# Patient Record
Sex: FEMALE | Race: BLACK OR AFRICAN AMERICAN
[De-identification: names, ages, dates, MRNs, and addresses within clinical notes are randomized per-mention and may not be internally consistent; named-entity substitution may affect disease eponyms.]

---

## 2017-03-14 ENCOUNTER — HOSPITAL ENCOUNTER (INPATIENT)
Dept: HOSPITAL 72 - EMR | Age: 67
LOS: 7 days | Discharge: HOME | DRG: 669 | End: 2017-03-21
Payer: MEDICARE

## 2017-03-14 VITALS — WEIGHT: 220 LBS | BODY MASS INDEX: 36.65 KG/M2 | HEIGHT: 65 IN

## 2017-03-14 VITALS — DIASTOLIC BLOOD PRESSURE: 77 MMHG | SYSTOLIC BLOOD PRESSURE: 173 MMHG

## 2017-03-14 VITALS — SYSTOLIC BLOOD PRESSURE: 166 MMHG | DIASTOLIC BLOOD PRESSURE: 76 MMHG

## 2017-03-14 DIAGNOSIS — J44.9: ICD-10-CM

## 2017-03-14 DIAGNOSIS — J45.909: ICD-10-CM

## 2017-03-14 DIAGNOSIS — I11.9: ICD-10-CM

## 2017-03-14 DIAGNOSIS — N13.2: Primary | ICD-10-CM

## 2017-03-14 DIAGNOSIS — N17.9: ICD-10-CM

## 2017-03-14 DIAGNOSIS — R91.1: ICD-10-CM

## 2017-03-14 DIAGNOSIS — G62.9: ICD-10-CM

## 2017-03-14 DIAGNOSIS — J31.0: ICD-10-CM

## 2017-03-14 DIAGNOSIS — E11.9: ICD-10-CM

## 2017-03-14 DIAGNOSIS — R31.9: ICD-10-CM

## 2017-03-14 LAB
ALBUMIN/GLOB SERPL: 1.1 {RATIO} (ref 1–2.7)
ALT SERPL-CCNC: 14 U/L (ref 3–33)
ANION GAP SERPL CALC-SCNC: 18 MMOL/L (ref 5–15)
APPEARANCE UR: CLEAR
AST SERPL-CCNC: 16 U/L (ref 5–40)
BACTERIA #/AREA URNS HPF: (no result) /HPF
BASOPHILS NFR BLD AUTO: 1.4 % (ref 0–2)
CALCIUM SERPL-MCNC: 10.3 MG/DL (ref 8.6–10.2)
CHLORIDE SERPL-SCNC: 95 MEQ/L (ref 98–107)
CO2 SERPL-SCNC: 26 MEQ/L (ref 20–30)
CREAT SERPL-MCNC: 1.5 MG/DL (ref 0.5–0.9)
EOSINOPHIL NFR BLD AUTO: 0.8 % (ref 0–3)
ERYTHROCYTE [DISTWIDTH] IN BLOOD BY AUTOMATED COUNT: 13.7 % (ref 11.6–14.8)
GFR SERPLBLD BASED ON 1.73 SQ M-ARVRAT: 42.1 ML/MIN (ref 60–?)
GLOBULIN SER-MCNC: 3.8 G/DL
HEMOLYSIS: 5
KETONES UR QL STRIP: NEGATIVE
LEUKOCYTE ESTERASE UR QL STRIP: (no result)
LIPASE SERPL-CCNC: 24 U/L (ref ?–60)
LYMPHOCYTES NFR BLD AUTO: 21.2 % (ref 20–45)
MCH RBC QN AUTO: 27.9 PG (ref 27–31)
MCHC RBC AUTO-ENTMCNC: 32.1 G/DL (ref 32–36)
MCV RBC AUTO: 87 FL (ref 80–99)
MONOCYTES NFR BLD AUTO: 6.7 % (ref 1–10)
NEUTROPHILS NFR BLD AUTO: 70 % (ref 45–75)
NITRITE UR QL STRIP: NEGATIVE
PH UR STRIP: 6 [PH] (ref 4.5–8)
PLATELET # BLD: 227 K/UL (ref 150–450)
PMV BLD AUTO: 8.6 FL (ref 6.5–10.1)
POTASSIUM SERPL-SCNC: 4.1 MEQ/L (ref 3.4–4.9)
PROT SERPL-MCNC: 8.1 G/DL (ref 6.6–8.7)
PROT UR QL STRIP: NEGATIVE
RBC # BLD AUTO: 4.95 M/UL (ref 4.2–5.4)
RBC #/AREA URNS HPF: (no result) /HPF (ref 0–2)
SODIUM SERPL-SCNC: 139 MEQ/L (ref 135–145)
SP GR UR STRIP: 1.01 (ref 1–1.03)
SQUAMOUS #/AREA URNS LPF: (no result) /LPF
TROPONIN I SERPL-MCNC: < 0.3 NG/ML (ref ?–0.3)
UROBILINOGEN UR-MCNC: NORMAL MG/DL (ref 0–1)
WBC # BLD AUTO: 10.9 K/UL (ref 4.8–10.8)
WBC #/AREA URNS HPF: 0 /HPF (ref 0–2)

## 2017-03-14 PROCEDURE — 85025 COMPLETE CBC W/AUTO DIFF WBC: CPT

## 2017-03-14 PROCEDURE — 82360 CALCULUS ASSAY QUANT: CPT

## 2017-03-14 PROCEDURE — 87181 SC STD AGAR DILUTION PER AGT: CPT

## 2017-03-14 PROCEDURE — 94150 VITAL CAPACITY TEST: CPT

## 2017-03-14 PROCEDURE — 93005 ELECTROCARDIOGRAM TRACING: CPT

## 2017-03-14 PROCEDURE — 74176 CT ABD & PELVIS W/O CONTRAST: CPT

## 2017-03-14 PROCEDURE — 94664 DEMO&/EVAL PT USE INHALER: CPT

## 2017-03-14 PROCEDURE — 80053 COMPREHEN METABOLIC PANEL: CPT

## 2017-03-14 PROCEDURE — 84484 ASSAY OF TROPONIN QUANT: CPT

## 2017-03-14 PROCEDURE — 94003 VENT MGMT INPAT SUBQ DAY: CPT

## 2017-03-14 PROCEDURE — 80048 BASIC METABOLIC PNL TOTAL CA: CPT

## 2017-03-14 PROCEDURE — 36415 COLL VENOUS BLD VENIPUNCTURE: CPT

## 2017-03-14 PROCEDURE — 87086 URINE CULTURE/COLONY COUNT: CPT

## 2017-03-14 PROCEDURE — 94640 AIRWAY INHALATION TREATMENT: CPT

## 2017-03-14 PROCEDURE — 83690 ASSAY OF LIPASE: CPT

## 2017-03-14 PROCEDURE — 84550 ASSAY OF BLOOD/URIC ACID: CPT

## 2017-03-14 PROCEDURE — 94760 N-INVAS EAR/PLS OXIMETRY 1: CPT

## 2017-03-14 PROCEDURE — 81003 URINALYSIS AUTO W/O SCOPE: CPT

## 2017-03-14 PROCEDURE — 82962 GLUCOSE BLOOD TEST: CPT

## 2017-03-14 PROCEDURE — 71020: CPT

## 2017-03-14 PROCEDURE — 81001 URINALYSIS AUTO W/SCOPE: CPT

## 2017-03-15 VITALS — SYSTOLIC BLOOD PRESSURE: 162 MMHG | DIASTOLIC BLOOD PRESSURE: 69 MMHG

## 2017-03-15 VITALS — SYSTOLIC BLOOD PRESSURE: 124 MMHG | DIASTOLIC BLOOD PRESSURE: 77 MMHG

## 2017-03-15 VITALS — SYSTOLIC BLOOD PRESSURE: 134 MMHG | DIASTOLIC BLOOD PRESSURE: 61 MMHG

## 2017-03-15 VITALS — SYSTOLIC BLOOD PRESSURE: 148 MMHG | DIASTOLIC BLOOD PRESSURE: 68 MMHG

## 2017-03-15 VITALS — DIASTOLIC BLOOD PRESSURE: 59 MMHG | SYSTOLIC BLOOD PRESSURE: 153 MMHG

## 2017-03-15 VITALS — DIASTOLIC BLOOD PRESSURE: 80 MMHG | SYSTOLIC BLOOD PRESSURE: 148 MMHG

## 2017-03-15 VITALS — SYSTOLIC BLOOD PRESSURE: 167 MMHG | DIASTOLIC BLOOD PRESSURE: 91 MMHG

## 2017-03-15 LAB
APPEARANCE UR: CLEAR
KETONES UR QL STRIP: NEGATIVE
NITRITE UR QL STRIP: NEGATIVE
PH UR STRIP: 6 [PH] (ref 4.5–8)
PROT UR QL STRIP: NEGATIVE
SP GR UR STRIP: 1.01 (ref 1–1.03)
UROBILINOGEN UR-MCNC: NORMAL MG/DL (ref 0–1)

## 2017-03-15 RX ADMIN — LISINOPRIL SCH MG: 2.5 TABLET ORAL at 09:09

## 2017-03-15 RX ADMIN — HEPARIN SODIUM SCH UNITS: 5000 INJECTION INTRAVENOUS; SUBCUTANEOUS at 09:14

## 2017-03-15 RX ADMIN — HEPARIN SODIUM SCH UNITS: 5000 INJECTION INTRAVENOUS; SUBCUTANEOUS at 20:06

## 2017-03-15 RX ADMIN — BUDESONIDE SCH MG: 0.25 SUSPENSION RESPIRATORY (INHALATION) at 22:53

## 2017-03-15 RX ADMIN — DOCUSATE SODIUM SCH MG: 100 TABLET ORAL at 09:08

## 2017-03-15 RX ADMIN — GLIMEPIRIDE SCH MG: 1 TABLET ORAL at 06:15

## 2017-03-15 RX ADMIN — HYDROCODONE BITARTRATE AND ACETAMINOPHEN PRN EA: 10; 325 TABLET ORAL at 13:17

## 2017-03-15 RX ADMIN — SODIUM CHLORIDE SCH MLS/HR: 900 INJECTION, SOLUTION INTRAVENOUS at 23:05

## 2017-03-15 RX ADMIN — HYDROCODONE BITARTRATE AND ACETAMINOPHEN PRN EA: 10; 325 TABLET ORAL at 19:19

## 2017-03-15 RX ADMIN — FLUTICASONE PROPIONATE SCH SPRAY: 50 SPRAY, METERED NASAL at 09:00

## 2017-03-15 RX ADMIN — SODIUM CHLORIDE SCH MLS/HR: 900 INJECTION, SOLUTION INTRAVENOUS at 07:49

## 2017-03-15 RX ADMIN — BECLOMETHASONE DIPROPIONATE SCH PUFF: 40 AEROSOL, METERED RESPIRATORY (INHALATION) at 22:39

## 2017-03-15 RX ADMIN — HYDROCODONE BITARTRATE AND ACETAMINOPHEN PRN EA: 10; 325 TABLET ORAL at 06:55

## 2017-03-15 NOTE — DIAGNOSTIC IMAGING REPORT
Indication: Abdominal pain



Technique: Spiral acquisitions obtained through the abdomen and pelvis. No oral

contrast utilized, per emergency room physician request No IV contrast utilized,

per referring physician request.. Multiplanar reconstructions were generated. Total

dose length product 993 mGycm. CTDIvol(s) 18 mGy



Comparison: None



Findings: There is a left hip prosthesis, streak artifact from which could 

obscure

pathology in the pelvis. Exam is limited by lack of oral and IV contrast.



There is a 9 mm calculus at the left ureteropelvic junction. There is resultant mild

to moderate left hydronephrosis and slight infiltration of the perinephric fat.

Multiple intrarenal calculi are also demonstrated, largest having a long axis

dimension of 12 mm. The distal left ureter is nondilated, and no distal

intraureteral calculi are demonstrated. There are small calculi within the right

kidney is well, measuring up to 3 mm diameter. No right hydronephrosis, ureteral

calculi, or hydroureter. Lack of IV contrast limits assessment of the renal

parenchyma. No gross renal parenchymal mass or cyst demonstrated. The bladder 

is

unremarkable.



Lack of IV contrast limits assessment of solid organs. The liver is 

mildly

hypoattenuating, consistent with fatty change. No focal abnormality. The gallbladder

is surgically absent. Bile ducts are nondilated. The pancreas, spleen, adrenals 

are

unremarkable. No retroperitoneal or mesenteric mass or adenopathy. The uterus 

is

diffusely bulky, likely representing fibroid changes. No pelvic mass or 

adenopathy

otherwise.



No evidence of diverticulosis or diverticulitis. The appendix is not 

definitely

visualized, but there are no findings to suggest acute appendicitis. No small 

bowel

distention. There is a broad-based ventral hernia. There is some scarring along 

an

incision to the umbilicus. There is a small sliding-type hiatal hernia. 

Broad-based

ventral hernia, associated incisional scarring



Small sliding-type hiatal hernia the graft at the left lung base, there is a 9 mm

nodule which is incompletely visualized. There are atelectatic changes at both 

lung

bases. The heart is enlarged. There is a small pericardial effusion



Impression: Positive for 9 mm left ureteropelvic junction calculus, with 

resultant

mild/moderate hydronephrosis and swelling of the left kidney



Bilateral intrarenal calculi, larger on the left than on the right



9 mm left lung nodule, incompletely imaged. Recommend followup with chest CT



Cardiomegaly



Pericardial effusion



Fatty liver



Left hip prosthesis



Bilateral basilar pulmonary parenchymal atelectatic changes



Evidence of prior cholecystectomy



Fibroid uterus



This agrees with the preliminary interpretation provided overnight by 

[facility]

teleradiology service.



The CT scanner at [facility] is accredited by the [employer] College 

of

Radiology and the scans are performed using protocols designed to limit 

radiation

exposure to as low as reasonably achievable to attain images of sufficient

resolution adequate for diagnostic evaluation.

## 2017-03-15 NOTE — HISTORY AND PHYSICAL REPORT
DATE OF ADMISSION:  03/15/2017



CHIEF COMPLAINT:  Abdominal pain.



HISTORY OF PRESENT ILLNESS:  This is a 66-year-old, 

female, who came to the ER complaining of severe abdominal pain.



PAST MEDICAL HISTORY:

1. Type 2 diabetes mellitus.

2. Chronic obstructive pulmonary disease.

3. Chronic low back pain.

4. Peripheral neuropathy.

5. Hypertensive cardiovascular disease.

6. Left lung nodule.



MEDICATIONS:  She is on baby aspirin, atorvastatin, beclomethasone,

sodium docusate, fluticasone spray, Neurontin, glimepiride,

hydrochlorothiazide, Norco, lisinopril, nifedipine ER, and omeprazole.



ALLERGIES:  To diazepam and dobutamine.



FAMILY HISTORY:  Unremarkable.



SOCIAL HISTORY:  She lives at home.  Habits, she is nonsmoker and

nondrinker.  There is no history of illicit drug abuse.



REVIEW OF SYSTEMS:  HEENT:  Hearing and eyesight are normal.

Endocrine:  She has obesity and type 2 diabetes mellitus.  Respiratory:

She has chronic obstructive pulmonary disease.  Neurological:  No history

of stroke, syncope, or Parkinson disease.



PHYSICAL EXAMINATION:

GENERAL:  This is an elderly obese,  female, who is

in no acute distress.

VITAL SIGNS:  Blood pressure 148/60, pulse 87 and regular,

respirations 20, temperature 96.8 degrees, O2 saturation 94% on room

air.

HEENT:  The head is normocephalic and atraumatic.  Pupils are equal,

round, and reactive to light and accommodation consensually.

NECK:  Supple.  Trachea is midline.  There was no lymphadenopathy or

thyromegaly.

LUNGS:  Clear to auscultation and percussion.

HEART:  Regular rate and rhythm without rubs, murmurs, or gallops.

ABDOMEN:  Soft.  Bowel sounds are active.

EXTREMITIES:  No clubbing, cyanosis, or edema.

NEUROLOGIC:  She is alert and oriented x4.  Cranial nerves II through

XII are intact.



LABORATORY AND ANCILLARY DATA:  CBC within normal limits.  Serum

chemistry, creatinine 1.5, BUN 15, otherwise within normal limits and

glucose is 99.



IMAGING STUDIES:  A CAT scan of the abdomen and pelvis without

contrast shows a 9 mm left ureteropelvic junction calculus with mild to

moderate hydronephrosis and swelling of the left kidney, bilateral

intrarenal calculi largely on the left than on the right, 9 mm left lung

nodule incompletely _____, cardiomegaly, pericardial effusion, fatty

liver, and left hip prosthesis.



ASSESSMENT:

1. Left renal calculus with mild hydronephrosis.

2. Type 2 diabetes mellitus.

3. Chronic obstructive pulmonary disease.

4. Chronic low back pain.

5. Peripheral neuropathy.

6. Hypertensive cardiovascular disease.

7. Left lung nodule.



PLAN:

1. Intravenous fluid.

2. Rehydration.

3. Pain control.

4. Urology consult.









  ______________________________________________

  Gale Velasquez M.D.





DR:  SINDY

D:  03/15/2017 15:16

T:  03/15/2017 22:15

JOB#:  [id number]

CC:

## 2017-03-15 NOTE — EMERGENCY ROOM REPORT
History of Present Illness


General


Chief Complaint:  Abdominal Pain


Source:  Patient





Present Illness


HPI


66-year-old female presents ED complaining of abdominal pain.  States symptoms 

started earlier today.  Pain is on the left flank, radiating to the lower 

abdomen.  Denies any dysuria or and fevers chills.  Denies nausea or vomiting.  

Pain is sharp.  10 out of 10.  No other aggravating relieving factors.  Denies 

any other systemic symptom


Allergies:  


Coded Allergies:  


     DIAZEPAM (Verified  Allergy, Mild, Itching, 12/13/15)


     DOBUTAMINE (Verified  Allergy, Unknown, 12/13/15)





Patient History


Past Medical History:  DM, asthma


Past Surgical History:  none


Pertinent Family History:  none


Social History:  Denies: alcohol use, drug use, smoking


Pregnant Now:  No


Immunizations:  UTD


Reviewed Nursing Documentation:  PMH: Agreed, PSxH: Agreed





Nursing Documentation-PMH


Hx Cardiac Problems:  No


Hx Hypertension:  Yes - hyperlipidemia


Hx Asthma:  Yes


Hx Diabetes:  Yes


Hx Cancer:  No


Hx Gastrointestinal Problems:  Yes


Hx Neurological Problems:  No





Review of Systems


All Other Systems:  negative except mentioned in HPI





Physical Exam





Vital Signs








  Date Time  Temp Pulse Resp B/P Pulse Ox O2 Delivery O2 Flow Rate FiO2


 


3/14/17 20:38 98.8 94 19 178/85 96 Room Air  


 


3/15/17 00:30       2.0 








Sp02 EP Interpretation:  reviewed, normal


General Appearance:  no apparent distress, alert, GCS 15, non-toxic, obese


Head:  normocephalic, atraumatic


Eyes:  bilateral eye PERRL, bilateral eye normal inspection


ENT:  hearing grossly normal, normal pharynx, no angioedema, normal voice


Neck:  full range of motion, supple/symm/no masses


Respiratory:  chest non-tender, lungs clear, normal breath sounds, speaking 

full sentences


Cardiovascular #1:  regular rate, rhythm, no edema


Cardiovascular #2:  2+ carotid (R), 2+ carotid (L), 2+ radial (R), 2+ radial (L)

, 2+ dorsalis pedis (R), 2+ dorsalis pedis (L)


Gastrointestinal:  normal bowel sounds, soft, non-distended, no guarding, no 

rebound, tenderness


Rectal:  deferred


Genitourinary:  normal inspection, CVA tenderness (L)


Musculoskeletal:  back normal, gait/station normal, normal range of motion, non-

tender


Neurologic:  alert, oriented x3, responsive, motor strength/tone normal, 

sensory intact, speech normal


Psychiatric:  judgement/insight normal, memory normal, mood/affect normal, no 

suicidal/homicidal ideation


Reflexes:  3+ bicep (R), 3+ bicep (L), 3+ tricep (R), 3+ tricep (L), 3+ knee (R)

, 3+ knee (L)


Skin:  normal color, no rash, warm/dry, well hydrated


Lymphatic:  no adenopathy





Medical Decision Making


Diagnostic Impression:  


 Primary Impression:  


 Kidney stone on left side


 Additional Impression:  


 ARF (acute renal failure)


 Qualified Codes:  N17.9 - Acute kidney failure, unspecified


ER Course


Hospital Course 


66-year-old F presents to ED with L flank pain





Differential diagnosis includes-appendicitis, cholecystitis, kidney stone, 

pyelonephritis





Clinical course


Patient placed on stretcher.  After initial history and physical I ordered labs

, IV fluids, pain medications and CT scan 





Labs - noted leukocytosis, Cr 1.5, LFTs normal





CT scan shows 9mm stone on L with hydronephrosis/perinephric stranding 





Given pain meds, IVFs. patient admitted to Dr Hargrove





I feel this is a highly complex case requiring extensive working including EKG/

Rhythm strip, Xray/CT/US, Blood/urine lab work, repeat exams while in ED, and 

administration of strong opiates/narcotics for pain control, admission to 

hospital or close patient follow up.  





Diagnosis - kidney stone on left side. ARF





admitted to floor in serious condition





Labs








Test


  3/14/17


21:55 3/14/17


21:58


 


White Blood Count


  10.9 K/UL


(4.8-10.8) 


 


 


Red Blood Count


  4.95 M/UL


(4.20-5.40) 


 


 


Hemoglobin


  13.8 G/DL


(12.0-16.0) 


 


 


Hematocrit


  43.1 %


(37.0-47.0) 


 


 


Mean Corpuscular Volume 87 FL (80-99)  


 


Mean Corpuscular Hemoglobin


  27.9 PG


(27.0-31.0) 


 


 


Mean Corpuscular Hemoglobin


Concent 32.1 G/DL


(32.0-36.0) 


 


 


Red Cell Distribution Width


  13.7 %


(11.6-14.8) 


 


 


Platelet Count


  227 K/UL


(150-450) 


 


 


Mean Platelet Volume


  8.6 FL


(6.5-10.1) 


 


 


Neutrophils (%) (Auto)


  70.0 %


(45.0-75.0) 


 


 


Lymphocytes (%) (Auto)


  21.2 %


(20.0-45.0) 


 


 


Monocytes (%) (Auto)


  6.7 %


(1.0-10.0) 


 


 


Eosinophils (%) (Auto)


  0.8 %


(0.0-3.0) 


 


 


Basophils (%) (Auto)


  1.4 %


(0.0-2.0) 


 


 


Sodium Level


  139 mEQ/L


(135-145) 


 


 


Potassium Level


  4.1 mEQ/L


(3.4-4.9) 


 


 


Chloride Level


  95 mEQ/L


() 


 


 


Carbon Dioxide Level


  26 mEQ/L


(20-30) 


 


 


Anion Gap 18 (5-15)  


 


Blood Urea Nitrogen


  15 mg/dL


(7-23) 


 


 


Creatinine


  1.5 mg/dL


(0.5-0.9) 


 


 


Estimat Glomerular Filtration


Rate 42.1 mL/min


(>60) 


 


 


Glucose Level


  99 mg/dL


() 


 


 


Calcium Level


  10.3 mg/dL


(8.6-10.2) 


 


 


Total Bilirubin


  0.5 mg/dL


(0.0-1.2) 


 


 


Aspartate Amino Transf


(AST/SGOT) 16 U/L (5-40) 


  


 


 


Alanine Aminotransferase


(ALT/SGPT) 14 U/L (3-33) 


  


 


 


Alkaline Phosphatase


  63 U/L


() 


 


 


Troponin I


  < 0.30 ng/mL


(<=0.30) 


 


 


Total Protein


  8.1 g/dL


(6.6-8.7) 


 


 


Albumin


  4.3 g/dL


(3.5-5.2) 


 


 


Globulin 3.8 g/dL  


 


Albumin/Globulin Ratio 1.1 (1.0-2.7)  


 


Lipase 24 U/L (< 60)  


 


Urine Color  Yellow 


 


Urine Appearance  Clear 


 


Urine pH  6 (4.5-8.0) 


 


Urine Specific Gravity


  


  1.010


(1.005-1.035)


 


Urine Protein


  


  Negative


(NEGATIVE)


 


Urine Glucose (UA)


  


  Negative


(NEGATIVE)


 


Urine Ketones


  


  Negative


(NEGATIVE)


 


Urine Occult Blood  2+ (NEGATIVE) 


 


Urine Nitrite


  


  Negative


(NEGATIVE)


 


Urine Bilirubin


  


  Negative


(NEGATIVE)


 


Urine Urobilinogen


  


  Normal MG/DL


(0.0-1.0)


 


Urine Leukocyte Esterase  1+ (NEGATIVE) 


 


Urine RBC


  


  2-4 /HPF (0 -


2)


 


Urine WBC  0 /HPF (0 - 2) 


 


Urine Squamous Epithelial


Cells 


  Occasional


/LPF


 


Urine Bacteria


  


  None /HPF


(NONE)








CT/MRI/US Diagnostic Results


CT/MRI/US Diagnostic Results :  


   Imaging Test Ordered:  CT A/P


   Impression


Moderate left-sided hydronephrosis and perinephric stranding. A 9 mm stone is 

seen at the left UPJ





Last Vital Signs








  Date Time  Temp Pulse Resp B/P Pulse Ox O2 Delivery O2 Flow Rate FiO2


 


3/15/17 01:31 98.7       


 


3/15/17 00:30  93 15 153/59 98 Nasal Cannula 2.0 








Status:  improved


Disposition:  ADMITTED AS INPATIENT


Condition:  Serious


Referrals:  


SINDI JIMENEZ (PCP)











THUY SANCHEZ M.D. Mar 15, 2017 02:47

## 2017-03-16 VITALS — SYSTOLIC BLOOD PRESSURE: 127 MMHG | DIASTOLIC BLOOD PRESSURE: 77 MMHG

## 2017-03-16 VITALS — DIASTOLIC BLOOD PRESSURE: 74 MMHG | SYSTOLIC BLOOD PRESSURE: 137 MMHG

## 2017-03-16 VITALS — SYSTOLIC BLOOD PRESSURE: 128 MMHG | DIASTOLIC BLOOD PRESSURE: 78 MMHG

## 2017-03-16 VITALS — SYSTOLIC BLOOD PRESSURE: 140 MMHG | DIASTOLIC BLOOD PRESSURE: 78 MMHG

## 2017-03-16 VITALS — DIASTOLIC BLOOD PRESSURE: 76 MMHG | SYSTOLIC BLOOD PRESSURE: 148 MMHG

## 2017-03-16 VITALS — SYSTOLIC BLOOD PRESSURE: 137 MMHG | DIASTOLIC BLOOD PRESSURE: 73 MMHG

## 2017-03-16 LAB
ANION GAP SERPL CALC-SCNC: 17 MMOL/L (ref 5–15)
BACTERIA #/AREA URNS HPF: (no result) /HPF
CALCIUM SERPL-MCNC: 10.1 MG/DL (ref 8.6–10.2)
CHLORIDE SERPL-SCNC: 95 MEQ/L (ref 98–107)
CO2 SERPL-SCNC: 26 MEQ/L (ref 20–30)
CREAT SERPL-MCNC: 1.6 MG/DL (ref 0.5–0.9)
GFR SERPLBLD BASED ON 1.73 SQ M-ARVRAT: 38.9 ML/MIN (ref 60–?)
HEMOLYSIS: 3
LEUKOCYTE ESTERASE UR QL STRIP: (no result)
POTASSIUM SERPL-SCNC: 3.8 MEQ/L (ref 3.4–4.9)
RBC #/AREA URNS HPF: (no result) /HPF (ref 0–2)
SODIUM SERPL-SCNC: 138 MEQ/L (ref 135–145)
SQUAMOUS #/AREA URNS LPF: (no result) /LPF
URATE SERPL-MCNC: 7.1 MG/DL (ref 3–7.5)

## 2017-03-16 RX ADMIN — BUDESONIDE SCH MG: 0.25 SUSPENSION RESPIRATORY (INHALATION) at 19:42

## 2017-03-16 RX ADMIN — ZOLPIDEM TARTRATE PRN MG: 5 TABLET ORAL at 21:57

## 2017-03-16 RX ADMIN — HYDROCODONE BITARTRATE AND ACETAMINOPHEN PRN EA: 10; 325 TABLET ORAL at 18:05

## 2017-03-16 RX ADMIN — FLUTICASONE PROPIONATE SCH SPRAY: 50 SPRAY, METERED NASAL at 09:00

## 2017-03-16 RX ADMIN — ALBUTEROL SULFATE SCH MG: 2.5 SOLUTION RESPIRATORY (INHALATION) at 10:50

## 2017-03-16 RX ADMIN — BECLOMETHASONE DIPROPIONATE SCH PUFF: 40 AEROSOL, METERED RESPIRATORY (INHALATION) at 20:02

## 2017-03-16 RX ADMIN — ALBUTEROL SULFATE SCH MG: 2.5 SOLUTION RESPIRATORY (INHALATION) at 19:32

## 2017-03-16 RX ADMIN — GLIMEPIRIDE SCH MG: 1 TABLET ORAL at 06:04

## 2017-03-16 RX ADMIN — LISINOPRIL SCH MG: 2.5 TABLET ORAL at 09:11

## 2017-03-16 RX ADMIN — HYDROCODONE BITARTRATE AND ACETAMINOPHEN PRN EA: 10; 325 TABLET ORAL at 13:50

## 2017-03-16 RX ADMIN — ALBUTEROL SULFATE SCH MG: 2.5 SOLUTION RESPIRATORY (INHALATION) at 15:11

## 2017-03-16 RX ADMIN — SODIUM CHLORIDE SCH MLS/HR: 900 INJECTION, SOLUTION INTRAVENOUS at 16:15

## 2017-03-16 RX ADMIN — HEPARIN SODIUM SCH UNITS: 5000 INJECTION INTRAVENOUS; SUBCUTANEOUS at 20:28

## 2017-03-16 RX ADMIN — ALBUTEROL SULFATE SCH MG: 2.5 SOLUTION RESPIRATORY (INHALATION) at 23:23

## 2017-03-16 RX ADMIN — HYDROCODONE BITARTRATE AND ACETAMINOPHEN PRN EA: 10; 325 TABLET ORAL at 04:58

## 2017-03-16 RX ADMIN — DOCUSATE SODIUM SCH MG: 100 TABLET ORAL at 09:10

## 2017-03-16 RX ADMIN — SODIUM CHLORIDE SCH MLS/HR: 0.9 INJECTION INTRAVENOUS at 16:15

## 2017-03-16 RX ADMIN — BUDESONIDE SCH MG: 0.25 SUSPENSION RESPIRATORY (INHALATION) at 09:22

## 2017-03-16 RX ADMIN — HYDROCODONE BITARTRATE AND ACETAMINOPHEN PRN EA: 10; 325 TABLET ORAL at 23:59

## 2017-03-16 RX ADMIN — HEPARIN SODIUM SCH UNITS: 5000 INJECTION INTRAVENOUS; SUBCUTANEOUS at 09:00

## 2017-03-16 RX ADMIN — HYDROCODONE BITARTRATE AND ACETAMINOPHEN PRN EA: 10; 325 TABLET ORAL at 00:08

## 2017-03-16 NOTE — CONSULTATION
DATE OF CONSULTATION:  03/15/2017



CONSULTING PHYSICIAN:  Gavino Haro M.D.



REFERRING PHYSICIAN:  Gale Velasquez M.D.



REASON FOR CONSULTATION:  For evaluation of renal colic, obstructive

stone, and bladder tumor.



HISTORY OF PRESENT ILLNESS:  This is a 66-year-old 

female.  She came to the hospital because of left-sided abdominal pain.

She had a workup including a CT scan, which showed evidence of an

obstructive stone in the left proximal ureter with hydronephrosis.

Urology evaluation is requested.  Of note, she does have a urologist that

she sees as an outpatient and apparently a recent cystoscopy in the office

revealed possible tumor in the bladder.  The patient states that she is

scheduled for a bladder biopsy in the near future.



PAST MEDICAL HISTORY:  Significant for history of nephrolithiasis,

history of hyperlipidemia, asthma, diabetes, and gastroesophageal reflux

disease.



PAST SURGICAL HISTORY:  Unknown.



CURRENT MEDICATIONS:  In the hospital, the patient is on Pulmicort,

heparin, aspirin, Lipitor, Colace, Neurontin, hydrochlorothiazide,

Zestril, Procardia, Flonase, Amaryl, acetaminophen, Dilaudid, and

Zofran.



ALLERGIES:  Diazepam and dobutamine.



SOCIAL HISTORY:  She is currently nonsmoker.



FAMILY HISTORY:  Noncontributory.



REVIEW OF SYSTEMS:  As above.



PHYSICAL EXAMINATION:

GENERAL:  This is an elderly female, obese, in mild distress.

VITAL SIGNS:  Temperature is 96.1 degrees, blood pressure is 124/77,

pulse 70, and respirations are 19.

HEENT:  Normocephalic.  She is missing multiple teeth.

NECK:  Supple.

ABDOMEN:  Soft.

BACK:  No CVA tenderness.

EXTREMITIES:  No clubbing or cyanosis.



LABORATORY DATA:  UA shows 2 to 4 RBCs, white count 10.9, hemoglobin

13.8, and platelets are 227,000.  BUN is 15, creatinine 1.1, and potassium

4.1.



DIAGNOSTIC IMAGING STUDIES:  The patient has CT scan of the abdomen

and pelvis.  The films were reviewed.  There was basically a 9 mm left

ureteropelvic junction stone with mild-to-moderate left-sided

hydronephrosis.  There were also multiple other bilateral renal calculi,

which were larger on the left side.  There was mention of a lung nodule.

The bladder was reported to be normal.



IMPRESSION:

1. Nephrolithiasis with colic.

2. Left-sided hydronephrosis.

3. Mild acute kidney injury.

4. Hematuria.

5. History of bladder tumor.



PLAN AND DISCUSSION:  Again as noted above, the patient does have

significant renal colic on the left side with an obstructive stone and

hydronephrosis.  The stone is very large size and I do not believe she

will pass the stone.  I believe she will need to have placement of a stent

with lithotripsy in the near future.  She also has a history of bladder

tumor and I will discuss this with her outside urologist,  ________.

She probably needs to have a bladder biopsy.  At this time, I will

discontinue the aspirin so that we will be ready for the surgery if

needed.  She states that she has not been taking aspirin at home for about

a week or so also.  I will follow the patient and any other recommendation

will be forthcoming.



Thank you, Dr. Velasquez, for asking me to see this patient in

consultation.









  ______________________________________________

  Gavino Haro M.D. DR:  Ranjith

D:  03/15/2017 20:02

T:  03/16/2017 01:41

JOB#:  [id number]

CC:

## 2017-03-16 NOTE — UROLOGY PROGRESS NOTE
Assessment/Plan


Assessment/Plan


1. Nephrolithiasis with colic.


2. Left-sided hydronephrosis.


3. Mild acute kidney injury.


4. Hematuria.


5. History of bladder tumor.





d/w pt fully


unlikely to pass stone


plan cysto/stent/litho tomorrow


risks etc


pt may need multiple procedures given heavy stone burden


NPO post midnight





Subjective


Allergies:  


Coded Allergies:  


     DIAZEPAM (Verified  Allergy, Mild, Itching, 12/13/15)


     DOBUTAMINE (Verified  Allergy, Unknown, 12/13/15)


Subjective


all noted, feels fair, still with pain, case was d/w Dr. Sutton, very small 

bladder lesion with recent cysto





Objective





Last 24 Hour Vital Signs








  Date Time  Temp Pulse Resp B/P Pulse Ox O2 Delivery O2 Flow Rate FiO2


 


3/16/17 10:27 97.3       


 


3/16/17 09:32  75 18  99 Nasal Cannula 2.0 


 


3/16/17 09:22  72 18  92 Nasal Cannula 2.0 28


 


3/16/17 09:11    127/77    


 


3/16/17 09:10  77  127/77    


 


3/16/17 08:09      Nasal Cannula 2.0 28


 


3/16/17 08:09     92 Nasal Cannula 2.0 28


 


3/16/17 08:09  72 18   Nasal Cannula 2.0 28


 


3/16/17 07:53 97.3 77 20 127/77 98 Room Air  


 


3/16/17 06:03 97.5       


 


3/16/17 04:00 97.9 84 19 148/76 97 Nasal Cannula  


 


3/16/17 00:35 97.5 73 19 137/74 92 Nasal Cannula  


 


3/15/17 22:57      Nasal Cannula 2.0 


 


3/15/17 22:56     97 Nasal Cannula 2.0 


 


3/15/17 22:52  72 18  99 Nasal Cannula 2.0 


 


3/15/17 22:51  70 18  95 Nasal Cannula 2.0 


 


3/15/17 22:44      Room Air  


 


3/15/17 22:42  81 18  95 Room Air  


 


3/15/17 22:41  85 18   Room Air  


 


3/15/17 20:00 97.9 86 19 148/80 94 Room Air  


 


3/15/17 15:58 96.1 70 19 124/77 96 Nasal Cannula 2.0 


 


3/15/17 12:40 96.8 87 19 148/68 94 Room Air  

















Intake and Output  


 


 3/15/17 3/16/17





 19:00 07:00


 


Intake Total 360 ml 695 ml


 


Output Total 320 ml 


 


Balance 40 ml 695 ml


 


  


 


Intake Oral 360 ml 240 ml


 


IV Total  455 ml


 


Output Urine Total 320 ml 


 


# Voids 2 4











Current Medications








 Medications


  (Trade)  Dose


 Ordered  Sig/Ashwini


 Route


 PRN Reason  Start Time


 Stop Time Status Last Admin


Dose Admin


 


 Acetaminophen


  (Tylenol)  500 mg  Q6H  PRN


 ORAL


 Mild Pain/Temp > 100.5  3/15/17 05:30


 4/14/17 05:29   


 


 


 Acetaminophen/


 Hydrocodone Bitart


  (Norco 10/325)  1 ea  Q4H  PRN


 ORAL


 For Pain  3/15/17 05:30


 3/22/17 05:29  3/16/17 04:58


 


 


 Albuterol Sulfate


  (Proventil)  2.5 mg  Q4HRT


 N


   3/16/17 11:00


 3/21/17 10:59   


 


 


 Atorvastatin


 Calcium


  (Lipitor)  40 mg  DAILY


 ORAL


   3/15/17 09:00


 4/14/17 08:59  3/16/17 09:10


 


 


 Beclomethasone


 Dipropionate


  (Qvar 40 Inhaler)  1 puff  BEDTIME


 INH


   3/15/17 21:00


 4/14/17 20:59   


 


 


 Budesonide


  (Pulmicort)  0.25 mg  EVERY 12  HOURS


 N


   3/15/17 21:00


 4/14/17 20:59  3/16/17 09:22


 


 


 Docusate Sodium


  (Colace)  100 mg  DAILY


 ORAL


   3/15/17 09:00


 4/14/17 08:59  3/16/17 09:10


 


 


 Fluticasone


 Propionate 1 spray  1 spray  DAILY


 NASAL


   3/15/17 09:00


 4/14/17 08:59   


 


 


 Gabapentin


  (Neurontin)  600 mg  THREE TIMES A  DAY


 ORAL


   3/15/17 09:00


 4/14/17 08:59  3/16/17 09:11


 


 


 Glimepiride


  (Amaryl)  1 mg  BEFORE  BREAKFAST


 ORAL


   3/15/17 06:30


 4/14/17 06:29  3/16/17 06:04


 


 


 Heparin Sodium


  (Porcine)


  (Heparin 5000


 units/ml)  5,000 units  EVERY 12  HOURS


 SUBQ


   3/15/17 09:00


 4/14/17 08:59  3/15/17 20:06


 


 


 Hydrochlorothiazide


  (Hydrodiuril)  25 mg  DAILY


 ORAL


   3/15/17 09:00


 4/14/17 08:59  3/16/17 09:11


 


 


 Hydromorphone HCl


  (Dilaudid)  1 mg  Q6H  PRN


 IVP


 Severe Pain (Pain Scale 7-10)  3/15/17 05:30


 3/22/17 05:29   


 


 


 Lisinopril


  (Zestril)  5 mg  DAILY


 ORAL


   3/15/17 09:00


 4/14/17 08:59  3/16/17 09:11


 


 


 Nifedipine


  (Procardia XL)  30 mg  DAILY


 ORAL


   3/15/17 09:00


 4/14/17 08:59  3/16/17 09:10


 


 


 Ondansetron HCl


  (Zofran)  4 mg  Q6H  PRN


 IVP


 Nausea & Vomiting  3/15/17 05:30


 4/14/17 05:29   


 


 


 Potassium


 Chloride/Sodium


 Chloride


  (KCl/0.45% NS


 1000ml)  1,015 ml @ 


 65 mls/hr  [id number]


 IV


   3/15/17 08:00


 4/14/17 07:59  3/15/17 23:05


 





Laboratory Tests


3/15/17 21:00: 


Urine Color Pale yellow, Urine Appearance Clear, Urine pH 6, Urine Specific 

Gravity 1.010, Urine Protein Negative, Urine Glucose (UA) Negative, Urine 

Ketones Negative, Urine Occult Blood 1+H, Urine Nitrite Negative, Urine 

Bilirubin Negative, Urine Urobilinogen Normal, Urine Leukocyte Esterase 2+H, 

Urine RBC 0-2, Urine WBC 10-15H, Urine Squamous Epithelial Cells Few, Urine 

Bacteria ModerateH


3/16/17 05:45: 


Sodium Level 138, Potassium Level 3.8, Chloride Level 95L, Carbon Dioxide Level 

26, Anion Gap 17H, Blood Urea Nitrogen 19, Creatinine 1.6H, Estimat Glomerular 

Filtration Rate 38.9, Glucose Level 107H, Uric Acid 7.1, Calcium Level 10.1


Height (Feet):  5


Height (Inches):  5.00


Weight (Pounds):  222











FAN RICHMOND Mar 16, 2017 10:40

## 2017-03-16 NOTE — ANETHESIA PREOPERATIVE EVAL
Anesthesia Pre-op PMH/ROS


General


Date of Evaluation:  Mar 16, 2017


Time of Evaluation:  09:00


Anesthesiologist:  [place]


ASA Score:  ASA 3


Mallampati Score


Class I : Soft palate, uvula, fauces, pillars visible


Class II: Soft palate, uvula, fauces visible


Class III: Soft palate, base of uvula visible


Class IV: Only hard plate visible


Mallampati Classification:  Class II


Surgeon:  Tahir


Diagnosis:  right ureteral stone


Surgical Procedure:  Right cystoscopy, ureteroscopy, stent placement


Anesthesia History:  none


Family History:  no anesthesia problems


Allergies:  


Coded Allergies:  


     DIAZEPAM (Verified  Allergy, Mild, Itching, 12/13/15)


     DOBUTAMINE (Verified  Allergy, Unknown, 12/13/15)


Medications:  see eMAR





Past Medical History


Cardiovascular:  Reports: HTN, other - HLD


Pulmonary:  Reports: COPD, asthma


Gastrointestinal/Genitourinary:  Reports: CRI, GERD


Endocrine:  Reports: DM


Musculoskeletal/Integumentary:  Reports: OA


Other:  obesity


PSxH Narrative:


hiatal hernia repair, left hip arthroplasty, thyroidectomy





Anesthesia Pre-op Phys. Exam


Physician Exam





Last Vital Signs








  Date Time  Temp Pulse Resp B/P Pulse Ox O2 Delivery O2 Flow Rate FiO2


 


3/16/17 09:11    127/77    


 


3/16/17 09:10  77      


 


3/16/17 07:53 97.3  20  98 Room Air  


 


3/15/17 22:57       2.0 








Constitutional:  NAD


Cardiovascular:  RRR


Respiratory:  CTA





Airway Exam


Mallampati Score:  Class II





Anesthesia Pre-op A/P


Labs





Chemistry








Test


  3/16/17


05:45


 


Sodium Level


  138 mEQ/L


(135-145)


 


Potassium Level


  3.8 mEQ/L


(3.4-4.9)


 


Chloride Level


  95 mEQ/L


()  L


 


Carbon Dioxide Level


  26 mEQ/L


(20-30)


 


Anion Gap 17 (5-15)  H


 


Blood Urea Nitrogen


  19 mg/dL


(7-23)


 


Creatinine


  1.6 mg/dL


(0.5-0.9)  H


 


Estimat Glomerular Filtration


Rate 38.9 mL/min


(>60)


 


Glucose Level


  107 mg/dL


()  H


 


Uric Acid


  7.1 mg/dL


(3.0-7.5)


 


Calcium Level


  10.1 mg/dL


(8.6-10.2)











Studies


Pre-op Studies:  EKG - pending





Risk Assessment & Plan


Assessment:


ASA III


Plan:


GA


Status Change Before Surgery:  No





Pre-Antibiotics


Drug:  NI MARTINEZ M.D. Mar 16, 2017 09:26

## 2017-03-16 NOTE — CONSULTATION
DATE OF CONSULTATION:  03/16/2017



PULMONARY CONSULTATION



CONSULTING PHYSICIAN:  Justus Treadwell M.D.



REASON FOR CONSULTATION:  Asthma/COPD.



HISTORY OF PRESENT ILLNESS:  This is a 66-year-old female, presented

to the emergency room with severe abdominal pain.  The patient was noted

to have kidney stone, significant obstruction.  The patient now under the

care of Dr. Velasquez, who has currently evaluated and recommended further.

The patient does complain of some shortness of breath, chest tightness

and overall discomfort.  The patient is well known to me, was recently

seen in office.  The patient does have multiple medical problems, has had

history of PET positive one nodule.  The patient has undergone lung biopsy

in 2016, which revealed conclusive.  The patient also been treated for

asthma.  She does not complain of any sputum production at this time.  No

fever or chills.   Medical notes have been

reviewed.  The care discussed with the primary admitting doctor.



PAST MEDICAL HISTORY:  The patient's past medical history notable for

diabetes, anxiety, hypertension, hypercholesterolemia, history of MI, back

in 2010, arthritis, chronic pain syndrome, asthma, stress test done in

2012.  The patient also has had prior thyroid surgery, appendectomy,

tonsillectomy, hernia repair, hip replacement and lung biopsy.



SOCIAL HISTORY:  The patient is .  She lives alone.  She did

have three children.  The patient is a former smoker and none currently.



REVIEW OF SYSTEMS:  Otherwise negative with the exception of the

above.



PHYSICAL EXAMINATION:

GENERAL:  A well developed female, comfortable and alert.  The

patient with poor dentition and missing teeth.

VITAL SIGNS:  Temperature 97.3 degrees, pulse 77, respiration 20,

blood pressure 127/77 and saturation 98%.

HEENT:  Overall negative, otherwise oropharynx is clear.  No thrush.

 

NECK:  No jugular venous distention.

LUNGS:  Good air entry.  No scattered wheezes.

CARDIAC:  Normal S1 and S2.  Regular, rate, and rhythm.  Soft

systolic murmur, left parasternal border.  No rubs or gallops.

ABDOMEN:  Soft and nontender.  No flank tenderness.

EXTREMITIES:  No cyanosis or clubbing.  There is trace edema.

NEUROLOGIC:  Grossly nonfocal.



LABORATORY AND DIAGNOSTIC DATA:  Laboratory reviewed.  CBC today with

normal white cell count of 10.9.  Electrolytes, very normal creatinine

1.6, blood sugar is 107.  Imaging noted and reviewed.  The abdominal CT

that was done is notable for 9 mm ureteropelvic junction calculus with

hydronephrosis.



IMPRESSION:

1. Renal stone with associated hydronephrosis, associated flank pain.

2. Asthma, clinically stable.

3. Mild chest tightness and cough.

4. Chronic rhinitis.

5. Hypercholesterolemia.



RECOMMENDATIONS:  Medications noted and reviewed.  The patient is

getting Pulmicort nebulized twice a day, we will give additional albuterol

and continue with inhaler, QVAR at bedtime.  I will follow clinically and

recommend oxygen therapy and assess for operative need.









  ______________________________________________

  Justus Treadwell M.D.





DR:  Justice

D:  03/16/2017 09:28

T:  03/16/2017 14:20

JOB#:  [id number]

CC:



ATUL

## 2017-03-16 NOTE — DIAGNOSTIC IMAGING REPORT
Indication: Cough



Technique: One view of the chest



Comparison: none



Findings: The heart is enlarged. Atelectasis and possibly some consolidation is seen

in the left lower lobe. Pleural spaces are clear. Right lung is clear.



Impression: Left lower lobe atelectasis and possibly some consolidation.



Cardiomegaly

## 2017-03-16 NOTE — NEPHROLOGY PROGRESS NOTE
Assessment/Plan


Plan


L RenaL Colic -for Cysto in AM By Dr Haro. Symptoms Resolving with IVF + 

Pain Control





Subjective


Subjective


No new c/o





Objective


Objective





Last 24 Hour Vital Signs








  Date Time  Temp Pulse Resp B/P Pulse Ox O2 Delivery O2 Flow Rate FiO2


 


3/16/17 13:51 97.9       


 


3/16/17 13:51 97.9       


 


3/16/17 12:22 97.9 77 19 137/73 95 Room Air  


 


3/16/17 11:05  85 18  99 Nasal Cannula 2.0 28


 


3/16/17 10:55  83 18  95 Room Air  


 


3/16/17 10:55        28


 


3/16/17 09:32  75 18  99 Nasal Cannula 2.0 


 


3/16/17 09:22  72 18  92 Nasal Cannula 2.0 28


 


3/16/17 09:11    127/77    


 


3/16/17 09:10  77  127/77    


 


3/16/17 08:09      Nasal Cannula 2.0 28


 


3/16/17 08:09     92 Nasal Cannula 2.0 28


 


3/16/17 08:09  72 18   Nasal Cannula 2.0 28


 


3/16/17 07:53 97.3 77 20 127/77 98 Room Air  


 


3/16/17 04:00 97.9 84 19 148/76 97 Nasal Cannula  


 


3/16/17 00:35 97.5 73 19 137/74 92 Nasal Cannula  


 


3/15/17 22:57      Nasal Cannula 2.0 


 


3/15/17 22:56     97 Nasal Cannula 2.0 


 


3/15/17 22:52  72 18  99 Nasal Cannula 2.0 


 


3/15/17 22:51  70 18  95 Nasal Cannula 2.0 


 


3/15/17 22:44      Room Air  


 


3/15/17 22:42  81 18  95 Room Air  


 


3/15/17 22:41  85 18   Room Air  


 


3/15/17 20:00 97.9 86 19 148/80 94 Room Air  


 


3/15/17 15:58 96.1 70 19 124/77 96 Nasal Cannula 2.0 

















Intake and Output  


 


 3/15/17 3/16/17





 19:00 07:00


 


Intake Total 360 ml 695 ml


 


Output Total 320 ml 


 


Balance 40 ml 695 ml


 


  


 


Intake Oral 360 ml 240 ml


 


IV Total  455 ml


 


Output Urine Total 320 ml 


 


# Voids 2 4








Laboratory Tests


3/15/17 21:00: 


Urine Color Pale yellow, Urine Appearance Clear, Urine pH 6, Urine Specific 

Gravity 1.010, Urine Protein Negative, Urine Glucose (UA) Negative, Urine 

Ketones Negative, Urine Occult Blood 1+H, Urine Nitrite Negative, Urine 

Bilirubin Negative, Urine Urobilinogen Normal, Urine Leukocyte Esterase 2+H, 

Urine RBC 0-2, Urine WBC 10-15H, Urine Squamous Epithelial Cells Few, Urine 

Bacteria ModerateH


3/16/17 05:45: 


Sodium Level 138, Potassium Level 3.8, Chloride Level 95L, Carbon Dioxide Level 

26, Anion Gap 17H, Blood Urea Nitrogen 19, Creatinine 1.6H, Estimat Glomerular 

Filtration Rate 38.9, Glucose Level 107H, Uric Acid 7.1, Calcium Level 10.1


Height (Feet):  5


Height (Inches):  5.00


Weight (Pounds):  222


Objective


Cv RR


Lungs CTA


Abd SNT. BS+


E No CCE











SINDI JIMENEZ Mar 16, 2017 14:07

## 2017-03-17 VITALS — SYSTOLIC BLOOD PRESSURE: 147 MMHG | DIASTOLIC BLOOD PRESSURE: 70 MMHG

## 2017-03-17 VITALS — DIASTOLIC BLOOD PRESSURE: 75 MMHG | SYSTOLIC BLOOD PRESSURE: 135 MMHG

## 2017-03-17 VITALS — SYSTOLIC BLOOD PRESSURE: 143 MMHG | DIASTOLIC BLOOD PRESSURE: 57 MMHG

## 2017-03-17 VITALS — DIASTOLIC BLOOD PRESSURE: 71 MMHG | SYSTOLIC BLOOD PRESSURE: 114 MMHG

## 2017-03-17 VITALS — DIASTOLIC BLOOD PRESSURE: 60 MMHG | SYSTOLIC BLOOD PRESSURE: 142 MMHG

## 2017-03-17 VITALS — DIASTOLIC BLOOD PRESSURE: 66 MMHG | SYSTOLIC BLOOD PRESSURE: 132 MMHG

## 2017-03-17 VITALS — SYSTOLIC BLOOD PRESSURE: 150 MMHG | DIASTOLIC BLOOD PRESSURE: 66 MMHG

## 2017-03-17 VITALS — SYSTOLIC BLOOD PRESSURE: 135 MMHG | DIASTOLIC BLOOD PRESSURE: 75 MMHG

## 2017-03-17 VITALS — SYSTOLIC BLOOD PRESSURE: 137 MMHG | DIASTOLIC BLOOD PRESSURE: 71 MMHG

## 2017-03-17 VITALS — DIASTOLIC BLOOD PRESSURE: 65 MMHG | SYSTOLIC BLOOD PRESSURE: 125 MMHG

## 2017-03-17 VITALS — DIASTOLIC BLOOD PRESSURE: 70 MMHG | SYSTOLIC BLOOD PRESSURE: 141 MMHG

## 2017-03-17 VITALS — DIASTOLIC BLOOD PRESSURE: 67 MMHG | SYSTOLIC BLOOD PRESSURE: 118 MMHG

## 2017-03-17 VITALS — SYSTOLIC BLOOD PRESSURE: 152 MMHG | DIASTOLIC BLOOD PRESSURE: 67 MMHG

## 2017-03-17 VITALS — SYSTOLIC BLOOD PRESSURE: 132 MMHG | DIASTOLIC BLOOD PRESSURE: 67 MMHG

## 2017-03-17 PROCEDURE — 0T778DZ DILATION OF LEFT URETER WITH INTRALUMINAL DEVICE, VIA NATURAL OR ARTIFICIAL OPENING ENDOSCOPIC: ICD-10-PCS

## 2017-03-17 PROCEDURE — BT1FZZZ FLUOROSCOPY OF LEFT KIDNEY, URETER AND BLADDER: ICD-10-PCS

## 2017-03-17 PROCEDURE — 0TC48ZZ EXTIRPATION OF MATTER FROM LEFT KIDNEY PELVIS, VIA NATURAL OR ARTIFICIAL OPENING ENDOSCOPIC: ICD-10-PCS

## 2017-03-17 PROCEDURE — 0TC78ZZ EXTIRPATION OF MATTER FROM LEFT URETER, VIA NATURAL OR ARTIFICIAL OPENING ENDOSCOPIC: ICD-10-PCS

## 2017-03-17 RX ADMIN — BECLOMETHASONE DIPROPIONATE SCH PUFF: 40 AEROSOL, METERED RESPIRATORY (INHALATION) at 21:00

## 2017-03-17 RX ADMIN — HYDROCODONE BITARTRATE AND ACETAMINOPHEN PRN EA: 10; 325 TABLET ORAL at 09:33

## 2017-03-17 RX ADMIN — SODIUM CHLORIDE SCH MLS/HR: 900 INJECTION, SOLUTION INTRAVENOUS at 22:28

## 2017-03-17 RX ADMIN — HYDROCODONE BITARTRATE AND ACETAMINOPHEN PRN EA: 10; 325 TABLET ORAL at 05:41

## 2017-03-17 RX ADMIN — ALBUTEROL SULFATE SCH MG: 2.5 SOLUTION RESPIRATORY (INHALATION) at 08:11

## 2017-03-17 RX ADMIN — HEPARIN SODIUM SCH UNITS: 5000 INJECTION INTRAVENOUS; SUBCUTANEOUS at 09:00

## 2017-03-17 RX ADMIN — SODIUM CHLORIDE SCH MLS/HR: 900 INJECTION, SOLUTION INTRAVENOUS at 05:41

## 2017-03-17 RX ADMIN — ALBUTEROL SULFATE SCH MG: 2.5 SOLUTION RESPIRATORY (INHALATION) at 20:03

## 2017-03-17 RX ADMIN — ALBUTEROL SULFATE SCH MG: 2.5 SOLUTION RESPIRATORY (INHALATION) at 11:17

## 2017-03-17 RX ADMIN — FENTANYL CITRATE PRN MCG: 50 INJECTION INTRAMUSCULAR; INTRAVENOUS at 17:12

## 2017-03-17 RX ADMIN — FENTANYL CITRATE PRN MCG: 50 INJECTION INTRAMUSCULAR; INTRAVENOUS at 16:50

## 2017-03-17 RX ADMIN — ALBUTEROL SULFATE SCH MG: 2.5 SOLUTION RESPIRATORY (INHALATION) at 02:57

## 2017-03-17 RX ADMIN — ALBUTEROL SULFATE SCH MG: 2.5 SOLUTION RESPIRATORY (INHALATION) at 15:00

## 2017-03-17 RX ADMIN — GLIMEPIRIDE SCH MG: 1 TABLET ORAL at 06:30

## 2017-03-17 RX ADMIN — SODIUM CHLORIDE SCH MLS/HR: 0.9 INJECTION INTRAVENOUS at 09:27

## 2017-03-17 RX ADMIN — BUDESONIDE SCH MG: 0.25 SUSPENSION RESPIRATORY (INHALATION) at 21:00

## 2017-03-17 RX ADMIN — FLUTICASONE PROPIONATE SCH SPRAY: 50 SPRAY, METERED NASAL at 09:28

## 2017-03-17 RX ADMIN — BUDESONIDE SCH MG: 0.25 SUSPENSION RESPIRATORY (INHALATION) at 08:11

## 2017-03-17 RX ADMIN — DIPHENHYDRAMINE HYDROCHLORIDE PRN MG: 50 INJECTION INTRAMUSCULAR; INTRAVENOUS at 18:28

## 2017-03-17 RX ADMIN — LISINOPRIL SCH MG: 2.5 TABLET ORAL at 09:30

## 2017-03-17 RX ADMIN — DOCUSATE SODIUM SCH MG: 100 TABLET ORAL at 09:29

## 2017-03-17 NOTE — IMMEDIATE POST-OP EVALUATION
Immediate Post-Op Evalulation


Immediate Post-Op Evalulation


Procedure:  Cysto retrograde pyelogram lithotrypsy


Date of Evaluation:  Mar 17, 2017


Time of Evaluation:  16:24


IV Fluids:  800


Blood Products:  none


Estimated Blood Loss:  min


Urinary Output:  n/a


Blood Pressure Systolic:  141


Blood Pressure Diastolic:  70


Pulse Rate:  73


Respiratory Rate:  2


O2 Sat by Pulse Oximetry:  94


Temperature (Fahrenheit):  98.3


Pain Score (1-10):  2


Nausea:  No


Vomiting:  No


Complications


none


Patient Status:  reacts, patent, extubated, none


Hydration Status:  adequate











GISELLA SMITH M.D. Mar 17, 2017 16:25

## 2017-03-17 NOTE — PRE-PROCEDURE NOTE/ATTESTATION
Pre-Procedure Note/Attestation


Complete Prior to Procedure


Planned Procedure:  left


Procedure Narrative:


cystoscopy, possible bladder biopsy, left ureteroscopy, laser and shockwave 

lithotripsy, ureteral stent





Indications for Procedure


Pre-Operative Diagnosis:


obstructive left ureteral calculus, hx of bladder lesion





Attestation


I attest that I discussed the nature of the procedure; its benefits; risks and 

complications; and alternatives (and the risks and benefits of such alternatives

), prior to the procedure, with the patient (or the patient's legal 

representative).





I attest that, if there was a reasonable possibility of needing a blood 

transfusion, the patient (or the patient's legal representative) was given the 

[place] Department [employer] Services standardized written summary, pursuant 

to the Caleb [place] Blood Safety Act ([place] Health and Safety Code # 1645, as 

amended).





I attest that I re-evaluated the patient just prior to the surgery and that 

there has been no change in the patient's H&P, except as documented below:


see progress notes











FAN RICHMOND Mar 17, 2017 11:29

## 2017-03-17 NOTE — ANETHESIA PREOPERATIVE EVAL
Anesthesia Pre-op PMH/ROS


General


Date of Evaluation:  Mar 17, 2017


Time of Evaluation:  13:50


Anesthesiologist:  Tanika


ASA Score:  ASA 3


Mallampati Score


Class I : Soft palate, uvula, fauces, pillars visible


Class II: Soft palate, uvula, fauces visible


Class III: Soft palate, base of uvula visible


Class IV: Only hard plate visible


Mallampati Classification:  Class II


Surgeon:  Tahir


Diagnosis:  L kidney stones


Surgical Procedure:  Cysto Retrograde pyelogram


Anesthesia History:  none


Allergies:  


Coded Allergies:  


     DIAZEPAM (Verified  Allergy, Mild, Itching, 12/13/15)


     DOBUTAMINE (Verified  Allergy, Unknown, 12/13/15)





Past Medical History


Cardiovascular:  Reports: CAD, HTN


Pulmonary:  Reports: COPD, asthma, 


   Denies: PARISH, other


Gastrointestinal/Genitourinary:  Reports: GERD, other - kidney stones, 


   Denies: CRI, ESRD


Neurologic/Psychiatric:  Reports: depression/anxiety, 


   Denies: CVA, TIA, dementia, other


Endocrine:  Reports: DM - stable o pills, 


   Denies: hypothyroidism, other, steroids


HEENT:  Reports: cataract (L)


Hematology/Immune:  Reports: anemia


Musculoskeletal/Integumentary:  Reports: DJD, 


   Denies: DDD, OA, RA, edema, other


Other:  obesity


PMH Narrative:


as above


PSxH Narrative:


cholecystectomy, appendectomy,thyroid, L hip arthroplasty





Anesthesia Pre-op Phys. Exam


Physician Exam





Last Vital Signs








  Date Time  Temp Pulse Resp B/P Pulse Ox O2 Delivery O2 Flow Rate FiO2


 


3/17/17 11:50 97.2 73 18 114/71 94 Nasal Cannula 2.0 


 


3/17/17 11:27        28








Constitutional:  NAD


Neurologic:  CN 2-12 intact


Cardiovascular:  RRR, no M/R/G


Respiratory:  CTA


Gastrointestinal:  other - obesity





Airway Exam


Mallampati Score:  Class II


MO:  limited


Neck:  stiff


ROM:  limited


Teeth:  missing


Dentures:  lower, upper





Anesthesia Pre-op A/P


Labs


see chart





Risk Assessment & Plan


Assessment:


ASA 3


Plan:


GA with ETT


Status Change Before Surgery:  No





Pre-Antibiotics


Drug:  Cefoxitin 1gr.


Given Within 1 Hr of Incision:  Yes


Time Given:  14:25











GISELLA SMITH M.D. Mar 17, 2017 15:03

## 2017-03-17 NOTE — PULMONOLOGY PROGRESS NOTE
Assessment/Plan


Assessment/Plan





IMPRESSION:


1. Renal stone with associated hydronephrosis, associated flank pain.


2. Asthma, clinically stable.


3. Mild chest tightness and cough.


4. Chronic rhinitis.


5. Hypercholesterolemia.








PLAN


care noted


same RX


follow up clinically 


no change for now


monitor as is





impression, plan, and exam edited and reviewed in detail


care discussed with RN





Subjective


Allergies:  


Coded Allergies:  


     DIAZEPAM (Verified  Allergy, Mild, Itching, 12/13/15)


     DOBUTAMINE (Verified  Allergy, Unknown, 12/13/15)


Subjective


care noted and discussed 


some sob but no congestion





Objective





Last 24 Hour Vital Signs








  Date Time  Temp Pulse Resp B/P Pulse Ox O2 Delivery O2 Flow Rate FiO2


 


3/17/17 11:50 97.2 73 18 114/71 94 Nasal Cannula 2.0 


 


3/17/17 11:27  82 14  99 Nasal Cannula 2.0 28


 


3/17/17 11:17  78 16  95 Nasal Cannula 2.0 28


 


3/17/17 09:30    134/64    


 


3/17/17 09:29  74  134/64    


 


3/17/17 08:28  86 18  99 Nasal Cannula 2.0 


 


3/17/17 08:19  80 18  98 Nasal Cannula 2.0 28


 


3/17/17 08:18  76 16  99 Nasal Cannula 2.0 28


 


3/17/17 08:10 97.5 68 18 118/67 95 Nasal Cannula 2.0 


 


3/17/17 08:08      Nasal Cannula 2.0 28


 


3/17/17 08:08     94 Nasal Cannula 2.0 28


 


3/17/17 08:08  72 16  94 Nasal Cannula 2.0 28


 


3/17/17 08:08  72 18   Nasal Cannula 2.0 28


 


3/17/17 04:00 97.7 74 18 125/65 97 Nasal Cannula 2.0 


 


3/17/17 03:05  79 14  99 Nasal Cannula 2.0 28


 


3/17/17 02:59        28


 


3/17/17 02:57  70 16  94 Nasal Cannula 2.0 28


 


3/17/17 00:00 97.4 71 18 142/60 97 Room Air  


 


3/16/17 23:43  83 18  99 Nasal Cannula 2.0 28


 


3/16/17 23:23  86 18  96 Nasal Cannula 2.0 28


 


3/16/17 23:23        28


 


3/16/17 20:21 98.1 86 18 140/78 93 Room Air  


 


3/16/17 19:48  86 18  99 Nasal Cannula 2.0 


 


3/16/17 19:43  84 18  99 Nasal Cannula 2.0 28


 


3/16/17 19:42  84 18  99 Nasal Cannula 2.0 28


 


3/16/17 19:35        28


 


3/16/17 19:34  83 18  92 Nasal Cannula 2.0 28


 


3/16/17 19:34      Nasal Cannula 2.0 28


 


3/16/17 19:34     92 Nasal Cannula 2.0 28


 


3/16/17 19:32  83 18   Nasal Cannula 2.0 28


 


3/16/17 16:10 98.1 90 19 128/78 92 Room Air  


 


3/16/17 15:11  85 18  99 Nasal Cannula 2.0 28


 


3/16/17 15:11        28


 


3/16/17 15:11  83 18  95 Room Air  

















Intake and Output  


 


 3/16/17 3/17/17





 19:00 07:00


 


Intake Total 880 ml 990 ml


 


Balance 880 ml 990 ml


 


  


 


Intake Oral 240 ml 240 ml


 


IV Total 640 ml 750 ml


 


# Voids 2 5








Objective


HEENT:  Overall negative, otherwise oropharynx is clear.  No thrush.


NECK:  No jugular venous distention.


LUNGS:  Good air entry.  some wheezes.


CARDIAC:  Normal S1 and S2.  Regular, rate, and rhythm.  Soft


systolic murmur, left parasternal border.  No rubs or gallops.


ABDOMEN:  Soft and nontender.  No flank tenderness.


EXTREMITIES:  No cyanosis or clubbing.  There is trace edema.


NEUROLOGIC:  Grossly nonfocal.





Microbiology








 Date/Time


Source Procedure


Growth Status


 


 


 3/15/17 21:00


Urine,Clean Catch Urine Culture - Preliminary


Gram Negative Bacillus 1 Resulted











Current Medications








 Medications


  (Trade)  Dose


 Ordered  Sig/Ashwini


 Route


 PRN Reason  Start Time


 Stop Time Status Last Admin


Dose Admin


 


 Acetaminophen


  (Tylenol)  500 mg  Q6H  PRN


 ORAL


 Mild Pain/Temp > 100.5  3/15/17 05:30


 4/14/17 05:29   


 


 


 Acetaminophen/


 Hydrocodone Bitart


  (Norco 10/325)  1 ea  Q4H  PRN


 ORAL


 For Pain  3/15/17 05:30


 3/22/17 05:29  3/17/17 09:33


 


 


 Al Hydroxide/Mg


 Hydroxide


  (Mylanta)  30 ml  TIDPRN  PRN


 ORAL


 INDIGESTION/DYSPEPSIA  3/16/17 21:30


 4/15/17 21:29  3/16/17 21:57


 


 


 Albuterol Sulfate


 2.5 mg  2.5 mg  Q4HRT


 HHN


   3/16/17 11:00


 3/21/17 10:59  3/17/17 11:17


 


 


 Atorvastatin


 Calcium


  (Lipitor)  40 mg  DAILY


 ORAL


   3/15/17 09:00


 4/14/17 08:59  3/17/17 09:33


 


 


 Beclomethasone


 Dipropionate


  (Qvar 40 Inhaler)  1 puff  BEDTIME


 INH


   3/15/17 21:00


 4/14/17 20:59   


 


 


 Budesonide


  (Pulmicort)  0.25 mg  EVERY 12  HOURS


 HHN


   3/15/17 21:00


 4/14/17 20:59  3/17/17 08:11


 


 


 Cefepime HCl/


 Dextrose


  (Maxipime/D5W)  55 ml @ 


 110 mls/hr  DAILY


 IVPB


   3/16/17 15:00


 3/23/17 14:59  3/17/17 09:27


 


 


 Docusate Sodium


  (Colace)  100 mg  DAILY


 ORAL


   3/15/17 09:00


 4/14/17 08:59  3/17/17 09:29


 


 


 Fluticasone


 Propionate 1 spray  1 spray  DAILY


 NASAL


   3/15/17 09:00


 4/14/17 08:59  3/17/17 09:28


 


 


 Gabapentin


  (Neurontin)  600 mg  THREE TIMES A  DAY


 ORAL


   3/15/17 09:00


 4/14/17 08:59  3/17/17 09:29


 


 


 Glimepiride


  (Amaryl)  1 mg  BEFORE  BREAKFAST


 ORAL


   3/15/17 06:30


 4/14/17 06:29  3/16/17 06:04


 


 


 Heparin Sodium


  (Porcine)


  (Heparin 5000


 units/ml)  5,000 units  EVERY 12  HOURS


 SUBQ


   3/15/17 09:00


 4/14/17 08:59  3/16/17 20:28


 


 


 Hydrochlorothiazide


  (Hydrodiuril)  25 mg  DAILY


 ORAL


   3/15/17 09:00


 4/14/17 08:59  3/17/17 09:29


 


 


 Hydromorphone HCl


  (Dilaudid)  1 mg  Q6H  PRN


 IVP


 Severe Pain (Pain Scale 7-10)  3/15/17 05:30


 3/22/17 05:29   


 


 


 Lisinopril


  (Zestril)  5 mg  DAILY


 ORAL


   3/15/17 09:00


 4/14/17 08:59  3/17/17 09:30


 


 


 Nifedipine


  (Procardia XL)  30 mg  DAILY


 ORAL


   3/15/17 09:00


 4/14/17 08:59  3/17/17 09:29


 


 


 Ondansetron HCl


  (Zofran)  4 mg  Q6H  PRN


 IVP


 Nausea & Vomiting  3/15/17 05:30


 4/14/17 05:29   


 


 


 Pantoprazole


  (Protonix)  40 mg  DAILY


 ORAL


   3/17/17 09:00


 4/16/17 08:59  3/17/17 09:30


 


 


 Potassium


 Chloride/Sodium


 Chloride


  (KCl/0.45% NS


 1000ml)  1,015 ml @ 


 65 mls/hr  [id number]


 IV


   3/15/17 08:00


 4/14/17 07:59  3/17/17 05:41


 


 


 Zolpidem Tartrate


  (Ambien)  5 mg  HSPRN  PRN


 ORAL


 Insomnia  3/16/17 21:15


 4/15/17 21:14  3/16/17 21:57


 

















ETHEL FERNANDEZ Mar 17, 2017 13:59

## 2017-03-17 NOTE — UROLOGY PROGRESS NOTE
Assessment/Plan


Assessment/Plan


1. Nephrolithiasis with colic.


2. Left-sided hydronephrosis.


3. Mild acute kidney injury.


4. Hematuria.


5. History of bladder tumor.





d/w pt fully


unlikely to pass stone


plan cysto/stent/litho today


risks etc


pt may need multiple procedures given heavy stone burden


NPO





Subjective


Allergies:  


Coded Allergies:  


     DIAZEPAM (Verified  Allergy, Mild, Itching, 12/13/15)


     DOBUTAMINE (Verified  Allergy, Unknown, 12/13/15)


Subjective


all noted, feels fair, still with pain, plan OR today





Objective





Last 24 Hour Vital Signs








  Date Time  Temp Pulse Resp B/P Pulse Ox O2 Delivery O2 Flow Rate FiO2


 


3/17/17 11:17  78 16  95 Nasal Cannula 2.0 28


 


3/17/17 09:30    134/64    


 


3/17/17 09:29  74  134/64    


 


3/17/17 08:28  86 18  99 Nasal Cannula 2.0 


 


3/17/17 08:19  80 18  98 Nasal Cannula 2.0 28


 


3/17/17 08:10 97.5 68 18 118/67 95 Nasal Cannula 2.0 


 


3/17/17 08:08      Nasal Cannula 2.0 28


 


3/17/17 08:08     94 Nasal Cannula 2.0 28


 


3/17/17 08:08  72 16  94 Nasal Cannula 2.0 28


 


3/17/17 08:08  72 18   Nasal Cannula 2.0 28


 


3/17/17 04:00 97.7 74 18 125/65 97 Nasal Cannula 2.0 


 


3/17/17 03:05  79 14  99 Nasal Cannula 2.0 28


 


3/17/17 02:59        28


 


3/17/17 02:57  70 16  94 Nasal Cannula 2.0 28


 


3/17/17 00:00 97.4 71 18 142/60 97 Room Air  


 


3/16/17 23:43  83 18  99 Nasal Cannula 2.0 28


 


3/16/17 23:23  86 18  96 Nasal Cannula 2.0 28


 


3/16/17 23:23        28


 


3/16/17 20:21 98.1 86 18 140/78 93 Room Air  


 


3/16/17 19:48  86 18  99 Nasal Cannula 2.0 


 


3/16/17 19:43  84 18  99 Nasal Cannula 2.0 28


 


3/16/17 19:42  84 18  99 Nasal Cannula 2.0 28


 


3/16/17 19:35        28


 


3/16/17 19:34  83 18  92 Nasal Cannula 2.0 28


 


3/16/17 19:34      Nasal Cannula 2.0 28


 


3/16/17 19:34     92 Nasal Cannula 2.0 28


 


3/16/17 19:32  83 18   Nasal Cannula 2.0 28


 


3/16/17 16:10 98.1 90 19 128/78 92 Room Air  


 


3/16/17 15:11  85 18  99 Nasal Cannula 2.0 28


 


3/16/17 15:11        28


 


3/16/17 15:11  83 18  95 Room Air  


 


3/16/17 13:51 97.9       


 


3/16/17 13:51 97.9       


 


3/16/17 12:22 97.9 77 19 137/73 95 Room Air  

















Intake and Output  


 


 3/16/17 3/17/17





 19:00 07:00


 


Intake Total 880 ml 990 ml


 


Balance 880 ml 990 ml


 


  


 


Intake Oral 240 ml 240 ml


 


IV Total 640 ml 750 ml


 


# Voids 2 5











Microbiology








 Date/Time


Source Procedure


Growth Status


 


 


 3/15/17 21:00


Urine,Clean Catch Urine Culture - Preliminary


Gram Negative Bacillus 1 Resulted








Current Medications








 Medications


  (Trade)  Dose


 Ordered  Sig/Ashwini


 Route


 PRN Reason  Start Time


 Stop Time Status Last Admin


Dose Admin


 


 Acetaminophen


  (Tylenol)  500 mg  Q6H  PRN


 ORAL


 Mild Pain/Temp > 100.5  3/15/17 05:30


 4/14/17 05:29   


 


 


 Acetaminophen/


 Hydrocodone Bitart


  (Norco 10/325)  1 ea  Q4H  PRN


 ORAL


 For Pain  3/15/17 05:30


 3/22/17 05:29  3/17/17 09:33


 


 


 Al Hydroxide/Mg


 Hydroxide


  (Mylanta)  30 ml  TIDPRN  PRN


 ORAL


 INDIGESTION/DYSPEPSIA  3/16/17 21:30


 4/15/17 21:29  3/16/17 21:57


 


 


 Albuterol Sulfate


 2.5 mg  2.5 mg  Q4HRT


 HHN


   3/16/17 11:00


 3/21/17 10:59  3/17/17 11:17


 


 


 Atorvastatin


 Calcium


  (Lipitor)  40 mg  DAILY


 ORAL


   3/15/17 09:00


 4/14/17 08:59  3/17/17 09:33


 


 


 Beclomethasone


 Dipropionate


  (Qvar 40 Inhaler)  1 puff  BEDTIME


 INH


   3/15/17 21:00


 4/14/17 20:59   


 


 


 Budesonide


  (Pulmicort)  0.25 mg  EVERY 12  HOURS


 HHN


   3/15/17 21:00


 4/14/17 20:59  3/17/17 08:11


 


 


 Cefepime HCl/


 Dextrose


  (Maxipime/D5W)  55 ml @ 


 110 mls/hr  DAILY


 IVPB


   3/16/17 15:00


 3/23/17 14:59  3/17/17 09:27


 


 


 Docusate Sodium


  (Colace)  100 mg  DAILY


 ORAL


   3/15/17 09:00


 4/14/17 08:59  3/17/17 09:29


 


 


 Fluticasone


 Propionate 1 spray  1 spray  DAILY


 NASAL


   3/15/17 09:00


 4/14/17 08:59  3/17/17 09:28


 


 


 Gabapentin


  (Neurontin)  600 mg  THREE TIMES A  DAY


 ORAL


   3/15/17 09:00


 4/14/17 08:59  3/17/17 09:29


 


 


 Glimepiride


  (Amaryl)  1 mg  BEFORE  BREAKFAST


 ORAL


   3/15/17 06:30


 4/14/17 06:29  3/16/17 06:04


 


 


 Heparin Sodium


  (Porcine)


  (Heparin 5000


 units/ml)  5,000 units  EVERY 12  HOURS


 SUBQ


   3/15/17 09:00


 4/14/17 08:59  3/16/17 20:28


 


 


 Hydrochlorothiazide


  (Hydrodiuril)  25 mg  DAILY


 ORAL


   3/15/17 09:00


 4/14/17 08:59  3/17/17 09:29


 


 


 Hydromorphone HCl


  (Dilaudid)  1 mg  Q6H  PRN


 IVP


 Severe Pain (Pain Scale 7-10)  3/15/17 05:30


 3/22/17 05:29   


 


 


 Lisinopril


  (Zestril)  5 mg  DAILY


 ORAL


   3/15/17 09:00


 4/14/17 08:59  3/17/17 09:30


 


 


 Nifedipine


  (Procardia XL)  30 mg  DAILY


 ORAL


   3/15/17 09:00


 4/14/17 08:59  3/17/17 09:29


 


 


 Ondansetron HCl


  (Zofran)  4 mg  Q6H  PRN


 IVP


 Nausea & Vomiting  3/15/17 05:30


 4/14/17 05:29   


 


 


 Pantoprazole


  (Protonix)  40 mg  DAILY


 ORAL


   3/17/17 09:00


 4/16/17 08:59  3/17/17 09:30


 


 


 Potassium


 Chloride/Sodium


 Chloride


  (KCl/0.45% NS


 1000ml)  1,015 ml @ 


 65 mls/hr  [id number]


 IV


   3/15/17 08:00


 4/14/17 07:59  3/17/17 05:41


 


 


 Zolpidem Tartrate


  (Ambien)  5 mg  HSPRN  PRN


 ORAL


 Insomnia  3/16/17 21:15


 4/15/17 21:14  3/16/17 21:57


 








Height (Feet):  5


Height (Inches):  5.00


Weight (Pounds):  220


Objective


 exam stable











BRYCEFAN MANCIA Mar 17, 2017 11:27

## 2017-03-17 NOTE — BRIEF OPERATIVE NOTE
Immediate Post Operative Note


Operative Note


Pre-op Diagnosis:


obstructive left ureteral calculus, hx of bladder lesion


Procedure:


cystoscopy, left ureteroscopy, laser and shockwave lithotripsy, ureteral stent


Post-op Diagnosis:  same as pre-op


Surgeon:  radha


Anesthesiologist:  ton


Anesthesia:  general


Specimen:  yes - stone fragment


Complications:  none


Condition:  stable


Estimated Blood Loss:  minimal


Implant(s) used?:  Yes - 6f x 26 cm left ureteral JJ stent











FAN RICHMOND Mar 17, 2017 16:03

## 2017-03-18 VITALS — DIASTOLIC BLOOD PRESSURE: 74 MMHG | SYSTOLIC BLOOD PRESSURE: 116 MMHG

## 2017-03-18 VITALS — DIASTOLIC BLOOD PRESSURE: 69 MMHG | SYSTOLIC BLOOD PRESSURE: 132 MMHG

## 2017-03-18 VITALS — SYSTOLIC BLOOD PRESSURE: 125 MMHG | DIASTOLIC BLOOD PRESSURE: 64 MMHG

## 2017-03-18 VITALS — SYSTOLIC BLOOD PRESSURE: 109 MMHG | DIASTOLIC BLOOD PRESSURE: 68 MMHG

## 2017-03-18 VITALS — SYSTOLIC BLOOD PRESSURE: 119 MMHG | DIASTOLIC BLOOD PRESSURE: 59 MMHG

## 2017-03-18 VITALS — DIASTOLIC BLOOD PRESSURE: 75 MMHG | SYSTOLIC BLOOD PRESSURE: 140 MMHG

## 2017-03-18 LAB
ANION GAP SERPL CALC-SCNC: 17 MMOL/L (ref 5–15)
BASOPHILS NFR BLD AUTO: 1.2 % (ref 0–2)
CALCIUM SERPL-MCNC: 9.8 MG/DL (ref 8.6–10.2)
CHLORIDE SERPL-SCNC: 95 MEQ/L (ref 98–107)
CO2 SERPL-SCNC: 26 MEQ/L (ref 20–30)
CREAT SERPL-MCNC: 1.2 MG/DL (ref 0.5–0.9)
EOSINOPHIL NFR BLD AUTO: 2.1 % (ref 0–3)
ERYTHROCYTE [DISTWIDTH] IN BLOOD BY AUTOMATED COUNT: 13.2 % (ref 11.6–14.8)
GFR SERPLBLD BASED ON 1.73 SQ M-ARVRAT: 54.3 ML/MIN (ref 60–?)
HEMOLYSIS: 1
LYMPHOCYTES NFR BLD AUTO: 33.1 % (ref 20–45)
MCH RBC QN AUTO: 27.7 PG (ref 27–31)
MCHC RBC AUTO-ENTMCNC: 32.1 G/DL (ref 32–36)
MCV RBC AUTO: 86 FL (ref 80–99)
MONOCYTES NFR BLD AUTO: 8.2 % (ref 1–10)
NEUTROPHILS NFR BLD AUTO: 55.4 % (ref 45–75)
PLATELET # BLD: 264 K/UL (ref 150–450)
PMV BLD AUTO: 9.9 FL (ref 6.5–10.1)
POTASSIUM SERPL-SCNC: 4.6 MEQ/L (ref 3.4–4.9)
RBC # BLD AUTO: 4.55 M/UL (ref 4.2–5.4)
SODIUM SERPL-SCNC: 138 MEQ/L (ref 135–145)
WBC # BLD AUTO: 6.1 K/UL (ref 4.8–10.8)

## 2017-03-18 RX ADMIN — ALBUTEROL SULFATE SCH MG: 2.5 SOLUTION RESPIRATORY (INHALATION) at 15:39

## 2017-03-18 RX ADMIN — BECLOMETHASONE DIPROPIONATE SCH PUFF: 40 AEROSOL, METERED RESPIRATORY (INHALATION) at 20:19

## 2017-03-18 RX ADMIN — GLIMEPIRIDE SCH MG: 1 TABLET ORAL at 05:37

## 2017-03-18 RX ADMIN — HYDROCODONE BITARTRATE AND ACETAMINOPHEN PRN EA: 10; 325 TABLET ORAL at 10:06

## 2017-03-18 RX ADMIN — ALBUTEROL SULFATE SCH MG: 2.5 SOLUTION RESPIRATORY (INHALATION) at 04:03

## 2017-03-18 RX ADMIN — ZOLPIDEM TARTRATE PRN MG: 5 TABLET ORAL at 02:13

## 2017-03-18 RX ADMIN — ALBUTEROL SULFATE SCH MG: 2.5 SOLUTION RESPIRATORY (INHALATION) at 19:51

## 2017-03-18 RX ADMIN — BUDESONIDE SCH MG: 0.25 SUSPENSION RESPIRATORY (INHALATION) at 09:10

## 2017-03-18 RX ADMIN — LISINOPRIL SCH MG: 2.5 TABLET ORAL at 09:13

## 2017-03-18 RX ADMIN — HYDROCODONE BITARTRATE AND ACETAMINOPHEN PRN EA: 10; 325 TABLET ORAL at 16:31

## 2017-03-18 RX ADMIN — ALBUTEROL SULFATE SCH MG: 2.5 SOLUTION RESPIRATORY (INHALATION) at 10:52

## 2017-03-18 RX ADMIN — SODIUM CHLORIDE SCH MLS/HR: 900 INJECTION, SOLUTION INTRAVENOUS at 05:12

## 2017-03-18 RX ADMIN — HYDROCODONE BITARTRATE AND ACETAMINOPHEN PRN EA: 10; 325 TABLET ORAL at 05:36

## 2017-03-18 RX ADMIN — DIPHENHYDRAMINE HYDROCHLORIDE PRN MG: 50 INJECTION INTRAMUSCULAR; INTRAVENOUS at 00:35

## 2017-03-18 RX ADMIN — ALBUTEROL SULFATE SCH MG: 2.5 SOLUTION RESPIRATORY (INHALATION) at 03:00

## 2017-03-18 RX ADMIN — BUDESONIDE SCH MG: 0.25 SUSPENSION RESPIRATORY (INHALATION) at 19:51

## 2017-03-18 RX ADMIN — HYDROCODONE BITARTRATE AND ACETAMINOPHEN PRN EA: 10; 325 TABLET ORAL at 23:34

## 2017-03-18 RX ADMIN — ALBUTEROL SULFATE SCH MG: 2.5 SOLUTION RESPIRATORY (INHALATION) at 07:22

## 2017-03-18 RX ADMIN — DOCUSATE SODIUM SCH MG: 100 TABLET ORAL at 09:12

## 2017-03-18 RX ADMIN — SODIUM CHLORIDE SCH MLS/HR: 0.9 INJECTION INTRAVENOUS at 10:07

## 2017-03-18 RX ADMIN — FLUTICASONE PROPIONATE SCH SPRAY: 50 SPRAY, METERED NASAL at 09:18

## 2017-03-18 NOTE — 48 HOUR POST ANESTHESIA EVAL
Post Anesthesia Evaluation


Procedure:  Cysto retrograde pyelogram lithotrypsy


Date of Evaluation:  Mar 18, 2017


Time of Evaluation:  10:16


Blood Pressure Systolic:  116


0:  74


Pulse Rate:  68


Respiratory Rate:  22


Temperature (Fahrenheit):  97.5


O2 Sat by Pulse Oximetry:  96


Airway:  patent


Nausea:  No


Vomiting:  No


Pain Intensity:  3


Hydration Status:  adequate


Cardiopulmonary Status:


stable


Mental Status/LOC:  patient returned to baseline


Follow-up Care/Observations:


n/a


Post-Anesthesia Complications:


none


Follow-up care needed:  N/A











GISELLA SMITH M.D. Mar 18, 2017 10:18

## 2017-03-18 NOTE — OPERATIVE NOTE - DICTATED
DATE OF OPERATION:  03/17/2017



PREOPERATIVE DIAGNOSIS:  Multiple left renal calculi.



POSTOPERATIVE DIAGNOSIS:  Multiple left renal calculi.



PROCEDURE PERFORMED:  Cystoscopy, left ureteroscopy, laser

lithotripsy, shockwave lithotripsy, stone extraction, retrograde

pyelogram, and placement of double-J stent.



OPERATIVE SURGEON:  Gavino Haro M.D.



ANESTHESIOLOGIST:  Adan Sagastume M.D.



ANESTHESIA:  General.



INDICATION FOR PROCEDURE:  This is a pleasant 66-year-old female.

She came to the hospital because of left-sided flank pain.  She had a

workup including a CT scan, which showed an obstructing stone of the left

UPJ with hydronephrosis.  There were also two other stones in the left

kidney in the upper pole and midpole.  There was also a questionable

history of bladder lesion, which the patient stated.  The patient was

having significant pain and because of this obstructing stone was of a

large size of about 9 mm or so before that it was unlikely that it would

pass and as such we decided to proceed with above procedure for definitive

treatment.  The patient has been on intravenous antibiotics on the floor.

The nature of the procedure including possible risks and complications of

bleeding, infection, anesthesia, damage to the urethra, bladder, ureter,

or need for further surgery were discussed.  I specifically did tell that

because of the heavy stone noted that she may eventually need to have

multiple procedures to completely clear all the stones.  All her questions

were answered.  No guarantees were given prior.



FINDINGS:  The patient had an obstructive stone in the UPJ, which is

packed and was pushed back into the kidney.  All three stones were

treated.



DESCRIPTION OF PROCEDURE:  After informed consent was obtained from

the patient, the patient was then brought to the operating room and placed

in the supine position.  After successful general sedation was induced,

the patient was then positioned over the focal point of the shockwave

generator, which was placed in a modified dorsal lithotomy position.

Preoperative intravenous antibiotics had been administered.  Time-out was

performed.  The urethra was gently dilated.  Cystoscopy was performed.

The bladder was inspected carefully.  I did not see any lesions or tumors

in the bladder or anything suspicious.  On  fluoroscopy, the stones

were then visible.  At this point, the left ureteral orifice was

cannulated with open-ended catheter and the guidewire was then passed up.

There was resistance noted at the level of the stone, which was impacted

at the UPJ.  I was able to negotiate the wire into the kidney.  This was

left as a safety wire.  Rigid ureteroscopy was then performed.  The distal

ureter was somewhat stenotic.  I had to pass a second wire and pass the

scope in to the wire to get to the stone, at which time the stone was

pushed back into the kidney.  There was return of some urine with

__________ purulent.  I did aspirate some of the urine from the kidney and

there was no purulence and no odor and I thought that it was safe for this

procedure.  At this point, a flexible cystoscope was then passed up into

the kidney.  The endoscopy was then performed.  The large obstructive

stone was identified in the renal pelvis.  I was able to eventually

identify the stone in the upper pole as well as the middle pole and then

the combination of laser lithotripsy was performed using a 200 micron

Holmium laser fiber as well as shockwave lithotripsy using the [employer]

Modulith and about 1200 or so shock waves were delivered to the large

stone and then rest of the shock waves were divided and the other two

stones excellent fragmentation was noted.  At the end of the procedure,

all the fragments appeared to be passable.  I did inject contrast to

delineate the anatomy.  There was moderate dilatation of the collecting

system.  I then used a tipless basket to remove the small fragments for

analysis and ureteroscope was removed.  A 6-[place] x 26 cm double-J stent

was passed into the collecting system under fluoroscopic guidance.  It

appeared to be in good position with a good curve in the kidney as well as

the bladder.  A small string was noted at the bladder end.  Arguello catheter

was placed.  The patient was awakened and was taken to the recovery room

in stable condition.  Blood loss is minimal.  No complications.









  ______________________________________________

  Gavino Haro M.D.





DR:  JOHAN

D:  03/18/2017 14:49

T:  03/18/2017 21:29

JOB#:  [id number]

CC:  Gale Velasquez M.D.; Fax#:  [phone]

## 2017-03-18 NOTE — GENERAL PROGRESS NOTE
Assessment/Plan


Assessment/Plan


1) left renal calculus with mild to moderate hydronephrosis and hematuria


2) KATHLEEN--resolved


3) Lung nodule


4) HTN


5) DM


6) COPD


7) peripheral neuropathy





Plan


monitor CBC


f/u 


continue Abx


lab today


Discussed with pt and RN





Subjective


Allergies:  


Coded Allergies:  


     DIAZEPAM (Verified  Allergy, Mild, Itching, 12/13/15)


     DOBUTAMINE (Verified  Allergy, Unknown, 12/13/15)


Subjective


Still having hematuria. flank pain is better now. No reported fever





Objective





Last 24 Hour Vital Signs








  Date Time  Temp Pulse Resp B/P Pulse Ox O2 Delivery O2 Flow Rate FiO2


 


3/18/17 12:00 97.3 72 20 109/68 95 Nasal Cannula 2.0 


 


3/18/17 11:02  71 18  97 Nasal Cannula 2.0 28


 


3/18/17 10:52  71 18  94 Nasal Cannula 2.0 28


 


3/18/17 10:18  68 22  96   


 


3/18/17 09:20  75 20  98 Nasal Cannula 2.0 28


 


3/18/17 09:13    116/74    


 


3/18/17 09:10  73 20  96 Nasal Cannula 2.0 28


 


3/18/17 08:00 96.8 78 20 116/74 95 Nasal Cannula 2.0 


 


3/18/17 07:22     94 Nasal Cannula 2.0 28


 


3/18/17 07:22  71 18  94 Nasal Cannula 2.0 28


 


3/18/17 07:22  71 18  97 Nasal Cannula 2.0 28


 


3/18/17 07:22      Nasal Cannula 2.0 28


 


3/18/17 07:22  71 20   Nasal Cannula 2.0 28


 


3/18/17 06:35 98.6       


 


3/18/17 04:00 98.6 79 18 119/59    


 


3/18/17 03:40  72 16  97 Nasal Cannula 2.0 28


 


3/18/17 03:30  70 16  94 Nasal Cannula 2.0 28


 


3/18/17 00:00 98.8 84 20 125/64 94 Nasal Cannula  


 


3/17/17 23:16  74 20  95 Nasal Cannula 2.0 28


 


3/17/17 23:16  78 20  98 Nasal Cannula 2.0 28


 


3/17/17 21:30  78 20  96 Nasal Cannula 2.0 28


 


3/17/17 21:30  81 20  98 Nasal Cannula 2.0 28


 


3/17/17 20:07  79 20  97 Nasal Cannula 2.0 28


 


3/17/17 20:06      Nasal Cannula 2.0 28


 


3/17/17 20:06  77 20  94 Nasal Cannula 2.0 28


 


3/17/17 20:06     94 Nasal Cannula 2.0 28


 


3/17/17 20:00 98.2 81 20 132/66 95 Nasal Cannula 2.0 


 


3/17/17 19:30  77 20   Nasal Cannula 2.0 28


 


3/17/17 17:27 97.8 73 16 150/66 97 Nasal Cannula 2.0 


 


3/17/17 17:15  75 17 147/70 95 Nasal Cannula 2.0 


 


3/17/17 17:10  72 16 152/67 95 Nasal Cannula 2.0 


 


3/17/17 17:00  76 18 135/75 95 Nasal Cannula 2.0 


 


3/17/17 16:50  72 15 135/75 95 Nasal Cannula 2.0 


 


3/17/17 16:45  76 14 132/67 97 Nasal Cannula 2.0 


 


3/17/17 16:30  77 15 137/71 95 Nasal Cannula 2.0 


 


3/17/17 16:25  73 2  94   


 


3/17/17 16:20  73 16 141/70 96 Nasal Cannula 2.0 


 


3/17/17 16:16 98.5 81 22 143/57 96 Nasal Cannula 2.0 


 


3/17/17 15:13      Nasal Cannula  


 


3/17/17 15:13      Nasal Cannula  

















Intake and Output  


 


 3/17/17 3/18/17





 19:00 07:00


 


Intake Total 615 ml 1715 ml


 


Output Total 900 ml 1900 ml


 


Balance -285 ml -185 ml


 


  


 


Intake Oral 100 ml 1000 ml


 


IV Total 515 ml 715 ml


 


Output Urine Total 900 ml 1900 ml


 


# Voids 2 








Height (Feet):  5


Height (Inches):  5.00


Weight (Pounds):  220


Objective


NAD, AAOx3


CTA b/l,no rales, no rhonchi


S1,S2,RRR, no M/R/G


soft, non tender, BS+


no edema, Arguello's cath with hematuria











OLSON,MESSI Mar 18, 2017 13:09

## 2017-03-18 NOTE — UROLOGY PROGRESS NOTE
Assessment/Plan


Assessment/Plan


1. Nephrolithiasis with colic.


2. Left-sided hydronephrosis.


3. Mild acute kidney injury.


4. Hematuria.


5. History of bladder tumor.


6. POD # 1, ureteroscopy/litho/stent





monitor clinically


abx


monitor renal fxn, check bmp


dc walters





Subjective


Allergies:  


Coded Allergies:  


     DIAZEPAM (Verified  Allergy, Mild, Itching, 12/13/15)


     DOBUTAMINE (Verified  Allergy, Unknown, 12/13/15)


Subjective


all noted, feels fair, flank pain resolved





Objective





Last 24 Hour Vital Signs








  Date Time  Temp Pulse Resp B/P Pulse Ox O2 Delivery O2 Flow Rate FiO2


 


3/18/17 10:52  71 18  94 Nasal Cannula 2.0 28


 


3/18/17 10:18  68 22  96   


 


3/18/17 09:20  75 20  98 Nasal Cannula 2.0 28


 


3/18/17 09:13    116/74    


 


3/18/17 09:10  73 20  96 Nasal Cannula 2.0 28


 


3/18/17 08:00 96.8 78 20 116/74 95 Nasal Cannula 2.0 


 


3/18/17 07:22     94 Nasal Cannula 2.0 28


 


3/18/17 07:22  71 18  94 Nasal Cannula 2.0 28


 


3/18/17 07:22  71 18  97 Nasal Cannula 2.0 28


 


3/18/17 07:22      Nasal Cannula 2.0 28


 


3/18/17 07:22  71 20   Nasal Cannula 2.0 28


 


3/18/17 06:35 98.6       


 


3/18/17 04:00 98.6 79 18 119/59    


 


3/18/17 03:40  72 16  97 Nasal Cannula 2.0 28


 


3/18/17 03:30  70 16  94 Nasal Cannula 2.0 28


 


3/18/17 00:00 98.8 84 20 125/64 94 Nasal Cannula  


 


3/17/17 23:16  74 20  95 Nasal Cannula 2.0 28


 


3/17/17 23:16  78 20  98 Nasal Cannula 2.0 28


 


3/17/17 21:30  78 20  96 Nasal Cannula 2.0 28


 


3/17/17 21:30  81 20  98 Nasal Cannula 2.0 28


 


3/17/17 20:07  79 20  97 Nasal Cannula 2.0 28


 


3/17/17 20:06      Nasal Cannula 2.0 28


 


3/17/17 20:06  77 20  94 Nasal Cannula 2.0 28


 


3/17/17 20:06     94 Nasal Cannula 2.0 28


 


3/17/17 20:00 98.2 81 20 132/66 95 Nasal Cannula 2.0 


 


3/17/17 19:30  77 20   Nasal Cannula 2.0 28


 


3/17/17 17:27 97.8 73 16 150/66 97 Nasal Cannula 2.0 


 


3/17/17 17:15  75 17 147/70 95 Nasal Cannula 2.0 


 


3/17/17 17:10  72 16 152/67 95 Nasal Cannula 2.0 


 


3/17/17 17:00  76 18 135/75 95 Nasal Cannula 2.0 


 


3/17/17 16:50  72 15 135/75 95 Nasal Cannula 2.0 


 


3/17/17 16:45  76 14 132/67 97 Nasal Cannula 2.0 


 


3/17/17 16:30  77 15 137/71 95 Nasal Cannula 2.0 


 


3/17/17 16:25  73 2  94   


 


3/17/17 16:20  73 16 141/70 96 Nasal Cannula 2.0 


 


3/17/17 16:16 98.5 81 22 143/57 96 Nasal Cannula 2.0 


 


3/17/17 15:13      Nasal Cannula  


 


3/17/17 15:13      Nasal Cannula  


 


3/17/17 11:50 97.2 73 18 114/71 94 Nasal Cannula 2.0 


 


3/17/17 11:27  82 14  99 Nasal Cannula 2.0 28


 


3/17/17 11:17  78 16  95 Nasal Cannula 2.0 28

















Intake and Output  


 


 3/17/17 3/18/17





 19:00 07:00


 


Intake Total 615 ml 1715 ml


 


Output Total 900 ml 1900 ml


 


Balance -285 ml -185 ml


 


  


 


Intake Oral 100 ml 1000 ml


 


IV Total 515 ml 715 ml


 


Output Urine Total 900 ml 1900 ml


 


# Voids 2 











Microbiology








 Date/Time


Source Procedure


Growth Status


 


 


 3/15/17 21:00


Urine,Clean Catch Urine Culture - Final


Escherichia Coli Complete








Current Medications








 Medications


  (Trade)  Dose


 Ordered  Sig/Ashwini


 Route


 PRN Reason  Start Time


 Stop Time Status Last Admin


Dose Admin


 


 Acetaminophen


  (Tylenol)  500 mg  Q6H  PRN


 ORAL


 Mild Pain/Temp > 100.5  3/15/17 05:30


 4/14/17 05:29   


 


 


 Acetaminophen/


 Hydrocodone Bitart


  (Norco 10/325)  1 ea  Q4H  PRN


 ORAL


 For Pain  3/15/17 05:30


 3/22/17 05:29  3/18/17 10:06


 


 


 Al Hydroxide/Mg


 Hydroxide


  (Mylanta)  30 ml  TIDPRN  PRN


 ORAL


 INDIGESTION/DYSPEPSIA  3/16/17 21:30


 4/15/17 21:29  3/16/17 21:57


 


 


 Albuterol Sulfate


 2.5 mg  2.5 mg  Q4HRT


 HHN


   3/16/17 11:00


 3/21/17 10:59  3/18/17 10:52


 


 


 Atorvastatin


 Calcium


  (Lipitor)  40 mg  DAILY


 ORAL


   3/15/17 09:00


 4/14/17 08:59  3/18/17 09:12


 


 


 Beclomethasone


 Dipropionate


  (Qvar 40 Inhaler)  1 puff  BEDTIME


 INH


   3/15/17 21:00


 4/14/17 20:59   


 


 


 Budesonide


  (Pulmicort)  0.25 mg  EVERY 12  HOURS


 HHN


   3/15/17 21:00


 4/14/17 20:59  3/18/17 09:10


 


 


 Cefepime HCl/


 Dextrose


  (Maxipime/D5W)  55 ml @ 


 110 mls/hr  DAILY


 IVPB


   3/16/17 15:00


 3/23/17 14:59  3/18/17 10:07


 


 


 Docusate Sodium


  (Colace)  100 mg  DAILY


 ORAL


   3/15/17 09:00


 4/14/17 08:59  3/18/17 09:12


 


 


 Fluticasone


 Propionate 1 spray  1 spray  DAILY


 NASAL


   3/15/17 09:00


 4/14/17 08:59  3/18/17 09:18


 


 


 Gabapentin


  (Neurontin)  600 mg  THREE TIMES A  DAY


 ORAL


   3/15/17 09:00


 4/14/17 08:59  3/18/17 09:13


 


 


 Glimepiride


  (Amaryl)  1 mg  BEFORE  BREAKFAST


 ORAL


   3/15/17 06:30


 4/14/17 06:29  3/18/17 05:37


 


 


 Hydrochlorothiazide


  (Hydrodiuril)  25 mg  DAILY


 ORAL


   3/15/17 09:00


 4/14/17 08:59  3/17/17 09:29


 


 


 Hydromorphone HCl


  (Dilaudid)  1 mg  Q6H  PRN


 IVP


 Severe Pain (Pain Scale 7-10)  3/15/17 05:30


 3/22/17 05:29  3/18/17 00:35


 


 


 Lisinopril


  (Zestril)  5 mg  DAILY


 ORAL


   3/15/17 09:00


 4/14/17 08:59  3/18/17 09:13


 


 


 Nifedipine


  (Procardia XL)  30 mg  DAILY


 ORAL


   3/15/17 09:00


 4/14/17 08:59  3/17/17 09:29


 


 


 Ondansetron HCl


  (Zofran)  4 mg  Q6H  PRN


 IVP


 Nausea & Vomiting  3/15/17 05:30


 4/14/17 05:29   


 


 


 Pantoprazole


  (Protonix)  40 mg  DAILY


 ORAL


   3/17/17 09:00


 4/16/17 08:59  3/18/17 09:13


 


 


 Potassium


 Chloride/Sodium


 Chloride


  (KCl/0.45% NS


 1000ml)  1,015 ml @ 


 65 mls/hr  [id number]


 IV


   3/15/17 08:00


 4/14/17 07:59  3/18/17 05:12


 


 


 Zolpidem Tartrate


  (Ambien)  5 mg  HSPRN  PRN


 ORAL


 Insomnia  3/16/17 21:15


 4/15/17 21:14  3/18/17 02:13


 








Height (Feet):  5


Height (Inches):  5.00


Weight (Pounds):  220


Objective


 exam stable, walters in place, urine blood-tinged











BRYCESHADFAN 18, 2017 11:09

## 2017-03-18 NOTE — PULMONOLOGY PROGRESS NOTE
Assessment/Plan


Assessment/Plan





IMPRESSION:


1. Renal stone with associated hydronephrosis, associated flank pain.


2. Asthma, clinically stable.


3. Mild chest tightness and cough.


4. Chronic rhinitis.


5. Hypercholesterolemia.








PLAN


care noted


same RX as is


follow up clinically 


no change for now and no other changes noted


monitor as is and will adjust if needed





impression, plan, and exam edited and reviewed in detail


care discussed with RN





Subjective


Allergies:  


Coded Allergies:  


     DIAZEPAM (Verified  Allergy, Mild, Itching, 12/13/15)


     DOBUTAMINE (Verified  Allergy, Unknown, 12/13/15)


Subjective


care noted and discussed 


mild cough





Objective





Last 24 Hour Vital Signs








  Date Time  Temp Pulse Resp B/P Pulse Ox O2 Delivery O2 Flow Rate FiO2


 


3/18/17 09:13    116/74    


 


3/18/17 07:22     94 Nasal Cannula 2.0 28


 


3/18/17 07:22  71 18  94 Nasal Cannula 2.0 28


 


3/18/17 07:22  71 18  97 Nasal Cannula 2.0 28


 


3/18/17 07:22      Nasal Cannula 2.0 28


 


3/18/17 07:22  71 20   Nasal Cannula 2.0 28


 


3/18/17 06:35 98.6       


 


3/18/17 04:00 98.6 79 18 119/59    


 


3/18/17 03:40  72 16  97 Nasal Cannula 2.0 28


 


3/18/17 03:30  70 16  94 Nasal Cannula 2.0 28


 


3/18/17 00:00 98.8 84 20 125/64 94 Nasal Cannula  


 


3/17/17 23:16  74 20  95 Nasal Cannula 2.0 28


 


3/17/17 23:16  78 20  98 Nasal Cannula 2.0 28


 


3/17/17 21:30  78 20  96 Nasal Cannula 2.0 28


 


3/17/17 21:30  81 20  98 Nasal Cannula 2.0 28


 


3/17/17 20:07  79 20  97 Nasal Cannula 2.0 28


 


3/17/17 20:06      Nasal Cannula 2.0 28


 


3/17/17 20:06  77 20  94 Nasal Cannula 2.0 28


 


3/17/17 20:06     94 Nasal Cannula 2.0 28


 


3/17/17 20:00 98.2 81 20 132/66 95 Nasal Cannula 2.0 


 


3/17/17 19:30  77 20   Nasal Cannula 2.0 28


 


3/17/17 17:27 97.8 73 16 150/66 97 Nasal Cannula 2.0 


 


3/17/17 17:15  75 17 147/70 95 Nasal Cannula 2.0 


 


3/17/17 17:10  72 16 152/67 95 Nasal Cannula 2.0 


 


3/17/17 17:00  76 18 135/75 95 Nasal Cannula 2.0 


 


3/17/17 16:50  72 15 135/75 95 Nasal Cannula 2.0 


 


3/17/17 16:45  76 14 132/67 97 Nasal Cannula 2.0 


 


3/17/17 16:30  77 15 137/71 95 Nasal Cannula 2.0 


 


3/17/17 16:25  73 2  94   


 


3/17/17 16:20  73 16 141/70 96 Nasal Cannula 2.0 


 


3/17/17 16:16 98.5 81 22 143/57 96 Nasal Cannula 2.0 


 


3/17/17 15:13      Nasal Cannula  


 


3/17/17 15:13      Nasal Cannula  


 


3/17/17 11:50 97.2 73 18 114/71 94 Nasal Cannula 2.0 


 


3/17/17 11:27  82 14  99 Nasal Cannula 2.0 28


 


3/17/17 11:17  78 16  95 Nasal Cannula 2.0 28


 


3/17/17 09:30    134/64    


 


3/17/17 09:29  74  134/64    

















Intake and Output  


 


 3/17/17 3/18/17





 19:00 07:00


 


Intake Total 615 ml 1715 ml


 


Output Total 900 ml 1900 ml


 


Balance -285 ml -185 ml


 


  


 


Intake Oral 100 ml 1000 ml


 


IV Total 515 ml 715 ml


 


Output Urine Total 900 ml 1900 ml


 


# Voids 2 








Objective


HEENT:  Overall negative, otherwise oropharynx is clear.  No thrush.


NECK:  No jugular venous distention.


LUNGS:  Good air entry.  some wheezes.


CARDIAC:  Normal S1 and S2.  Regular, rate, and rhythm.  Soft


systolic murmur, left parasternal border.  No rubs or gallops.


ABDOMEN:  Soft and nontender.  No flank tenderness.


EXTREMITIES:  No cyanosis or clubbing.  There is trace edema.


NEUROLOGIC:  Grossly nonfocal.





Microbiology








 Date/Time


Source Procedure


Growth Status


 


 


 3/15/17 21:00


Urine,Clean Catch Urine Culture - Preliminary


Gram Negative Bacillus 1 Resulted











Current Medications








 Medications


  (Trade)  Dose


 Ordered  Sig/Ashwini


 Route


 PRN Reason  Start Time


 Stop Time Status Last Admin


Dose Admin


 


 Acetaminophen


  (Tylenol)  500 mg  Q6H  PRN


 ORAL


 Mild Pain/Temp > 100.5  3/15/17 05:30


 4/14/17 05:29   


 


 


 Acetaminophen/


 Hydrocodone Bitart


  (Norco 10/325)  1 ea  Q4H  PRN


 ORAL


 For Pain  3/15/17 05:30


 3/22/17 05:29  3/18/17 05:36


 


 


 Al Hydroxide/Mg


 Hydroxide


  (Mylanta)  30 ml  TIDPRN  PRN


 ORAL


 INDIGESTION/DYSPEPSIA  3/16/17 21:30


 4/15/17 21:29  3/16/17 21:57


 


 


 Albuterol Sulfate


 2.5 mg  2.5 mg  Q4HRT


 HHN


   3/16/17 11:00


 3/21/17 10:59  3/18/17 07:22


 


 


 Atorvastatin


 Calcium


  (Lipitor)  40 mg  DAILY


 ORAL


   3/15/17 09:00


 4/14/17 08:59  3/18/17 09:12


 


 


 Beclomethasone


 Dipropionate


  (Qvar 40 Inhaler)  1 puff  BEDTIME


 INH


   3/15/17 21:00


 4/14/17 20:59   


 


 


 Budesonide


  (Pulmicort)  0.25 mg  EVERY 12  HOURS


 HHN


   3/15/17 21:00


 4/14/17 20:59  3/18/17 09:10


 


 


 Cefepime HCl/


 Dextrose


  (Maxipime/D5W)  55 ml @ 


 110 mls/hr  DAILY


 IVPB


   3/16/17 15:00


 3/23/17 14:59  3/17/17 09:27


 


 


 Docusate Sodium


  (Colace)  100 mg  DAILY


 ORAL


   3/15/17 09:00


 4/14/17 08:59  3/18/17 09:12


 


 


 Fluticasone


 Propionate 1 spray  1 spray  DAILY


 NASAL


   3/15/17 09:00


 4/14/17 08:59  3/18/17 09:18


 


 


 Gabapentin


  (Neurontin)  600 mg  THREE TIMES A  DAY


 ORAL


   3/15/17 09:00


 4/14/17 08:59  3/18/17 09:13


 


 


 Glimepiride


  (Amaryl)  1 mg  BEFORE  BREAKFAST


 ORAL


   3/15/17 06:30


 4/14/17 06:29  3/18/17 05:37


 


 


 Hydrochlorothiazide


  (Hydrodiuril)  25 mg  DAILY


 ORAL


   3/15/17 09:00


 4/14/17 08:59  3/17/17 09:29


 


 


 Hydromorphone HCl


  (Dilaudid)  1 mg  Q6H  PRN


 IVP


 Severe Pain (Pain Scale 7-10)  3/15/17 05:30


 3/22/17 05:29  3/18/17 00:35


 


 


 Lisinopril


  (Zestril)  5 mg  DAILY


 ORAL


   3/15/17 09:00


 4/14/17 08:59  3/18/17 09:13


 


 


 Nifedipine


  (Procardia XL)  30 mg  DAILY


 ORAL


   3/15/17 09:00


 4/14/17 08:59  3/17/17 09:29


 


 


 Ondansetron HCl


  (Zofran)  4 mg  Q6H  PRN


 IVP


 Nausea & Vomiting  3/15/17 05:30


 4/14/17 05:29   


 


 


 Pantoprazole


  (Protonix)  40 mg  DAILY


 ORAL


   3/17/17 09:00


 4/16/17 08:59  3/18/17 09:13


 


 


 Potassium


 Chloride/Sodium


 Chloride


  (KCl/0.45% NS


 1000ml)  1,015 ml @ 


 65 mls/hr  [id number]


 IV


   3/15/17 08:00


 4/14/17 07:59  3/18/17 05:12


 


 


 Zolpidem Tartrate


  (Ambien)  5 mg  HSPRN  PRN


 ORAL


 Insomnia  3/16/17 21:15


 4/15/17 21:14  3/18/17 02:13


 

















ETHEL FERNANDEZ 18, 2017 09:25

## 2017-03-19 VITALS — DIASTOLIC BLOOD PRESSURE: 71 MMHG | SYSTOLIC BLOOD PRESSURE: 129 MMHG

## 2017-03-19 VITALS — DIASTOLIC BLOOD PRESSURE: 73 MMHG | SYSTOLIC BLOOD PRESSURE: 142 MMHG

## 2017-03-19 VITALS — DIASTOLIC BLOOD PRESSURE: 56 MMHG | SYSTOLIC BLOOD PRESSURE: 136 MMHG

## 2017-03-19 VITALS — SYSTOLIC BLOOD PRESSURE: 133 MMHG | DIASTOLIC BLOOD PRESSURE: 52 MMHG

## 2017-03-19 VITALS — DIASTOLIC BLOOD PRESSURE: 69 MMHG | SYSTOLIC BLOOD PRESSURE: 120 MMHG

## 2017-03-19 VITALS — SYSTOLIC BLOOD PRESSURE: 140 MMHG | DIASTOLIC BLOOD PRESSURE: 80 MMHG

## 2017-03-19 RX ADMIN — BUDESONIDE SCH MG: 0.25 SUSPENSION RESPIRATORY (INHALATION) at 09:28

## 2017-03-19 RX ADMIN — ALBUTEROL SULFATE SCH MG: 2.5 SOLUTION RESPIRATORY (INHALATION) at 07:00

## 2017-03-19 RX ADMIN — ALBUTEROL SULFATE SCH MG: 2.5 SOLUTION RESPIRATORY (INHALATION) at 14:54

## 2017-03-19 RX ADMIN — ALBUTEROL SULFATE SCH MG: 2.5 SOLUTION RESPIRATORY (INHALATION) at 03:57

## 2017-03-19 RX ADMIN — DOCUSATE SODIUM SCH MG: 100 TABLET ORAL at 08:45

## 2017-03-19 RX ADMIN — SODIUM CHLORIDE SCH MLS/HR: 900 INJECTION, SOLUTION INTRAVENOUS at 04:03

## 2017-03-19 RX ADMIN — HYDROCODONE BITARTRATE AND ACETAMINOPHEN PRN EA: 10; 325 TABLET ORAL at 18:24

## 2017-03-19 RX ADMIN — ALBUTEROL SULFATE SCH MG: 2.5 SOLUTION RESPIRATORY (INHALATION) at 11:44

## 2017-03-19 RX ADMIN — HYDROCODONE BITARTRATE AND ACETAMINOPHEN PRN EA: 10; 325 TABLET ORAL at 08:47

## 2017-03-19 RX ADMIN — SODIUM CHLORIDE SCH MLS/HR: 0.9 INJECTION INTRAVENOUS at 10:35

## 2017-03-19 RX ADMIN — ALBUTEROL SULFATE SCH MG: 2.5 SOLUTION RESPIRATORY (INHALATION) at 00:30

## 2017-03-19 RX ADMIN — ALBUTEROL SULFATE SCH MG: 2.5 SOLUTION RESPIRATORY (INHALATION) at 23:28

## 2017-03-19 RX ADMIN — GLIMEPIRIDE SCH MG: 1 TABLET ORAL at 05:55

## 2017-03-19 RX ADMIN — BUDESONIDE SCH MG: 0.25 SUSPENSION RESPIRATORY (INHALATION) at 21:15

## 2017-03-19 RX ADMIN — HYDROCODONE BITARTRATE AND ACETAMINOPHEN PRN EA: 10; 325 TABLET ORAL at 04:04

## 2017-03-19 RX ADMIN — FLUTICASONE PROPIONATE SCH SPRAY: 50 SPRAY, METERED NASAL at 10:34

## 2017-03-19 RX ADMIN — LISINOPRIL SCH MG: 2.5 TABLET ORAL at 08:46

## 2017-03-19 RX ADMIN — SODIUM CHLORIDE SCH MLS/HR: 900 INJECTION, SOLUTION INTRAVENOUS at 20:09

## 2017-03-19 RX ADMIN — HYDROCODONE BITARTRATE AND ACETAMINOPHEN PRN EA: 10; 325 TABLET ORAL at 13:32

## 2017-03-19 RX ADMIN — ALBUTEROL SULFATE SCH MG: 2.5 SOLUTION RESPIRATORY (INHALATION) at 19:11

## 2017-03-19 NOTE — UROLOGY PROGRESS NOTE
Assessment/Plan


Assessment/Plan


1. Nephrolithiasis with colic.


2. Left-sided hydronephrosis.


3. Mild acute kidney injury, better.


4. Hematuria.


5. History of bladder tumor.


6. POD # 2, ureteroscopy/litho/stent





monitor clinically


abx


monitor renal fxn





Subjective


Allergies:  


Coded Allergies:  


     DIAZEPAM (Verified  Allergy, Mild, Itching, 12/13/15)


     DOBUTAMINE (Verified  Allergy, Unknown, 12/13/15)


Subjective


all noted, walters removed, voiding





Objective





Last 24 Hour Vital Signs








  Date Time  Temp Pulse Resp B/P Pulse Ox O2 Delivery O2 Flow Rate FiO2


 


3/19/17 09:39  74 20  100 Nasal Cannula 2.0 28


 


3/19/17 09:29  73 20  96 Nasal Cannula 2.0 28


 


3/19/17 08:46  92  140/80    


 


3/19/17 08:46    140/80    


 


3/19/17 08:00 97.3 92 20 140/80 100 Room Air  


 


3/19/17 07:48      Nasal Cannula  


 


3/19/17 07:47      Nasal Cannula  


 


3/19/17 07:47      Nasal Cannula  


 


3/19/17 05:03 97.9       


 


3/19/17 04:18 97.9 77 18 142/73 95 Nasal Cannula 2.0 


 


3/19/17 03:59  72 18  95 Nasal Cannula 2.0 28


 


3/19/17 03:47  70 18  93 Nasal Cannula 2.0 28


 


3/19/17 00:00 97.9 84 20 129/71 99  2.0 


 


3/18/17 23:55  77 18  97 Nasal Cannula 2.0 28


 


3/18/17 23:45  75 18  95 Nasal Cannula 2.0 28


 


3/18/17 20:04      Room Air  


 


3/18/17 20:00 97.3 79 20 132/69 97 Nasal Cannula 2.0 


 


3/18/17 19:55      Nasal Cannula 2.0 28


 


3/18/17 19:55      Nasal Cannula  


 


3/18/17 19:55     94 Nasal Cannula 2.0 28


 


3/18/17 19:54  72 20  94 Nasal Cannula 2.0 28


 


3/18/17 19:54        28


 


3/18/17 19:05  74 18  97 Nasal Cannula 2.0 28


 


3/18/17 16:00 97.7 71 20 140/75 96 Nasal Cannula 2.0 


 


3/18/17 15:49  66 16  99 Nasal Cannula 2.0 


 


3/18/17 15:39  66 18  95 Nasal Cannula 2.0 


 


3/18/17 12:00 97.3 72 20 109/68 95 Nasal Cannula 2.0 


 


3/18/17 11:02  71 18  97 Nasal Cannula 2.0 28


 


3/18/17 10:52  71 18  94 Nasal Cannula 2.0 28


 


3/18/17 10:18  68 22  96   

















Intake and Output  


 


 3/18/17 3/19/17





 19:00 07:00


 


Intake Total 890 ml 1440 ml


 


Output Total 1550 ml 1200 ml


 


Balance -660 ml 240 ml


 


  


 


Intake Oral 600 ml 660 ml


 


IV Total 290 ml 780 ml


 


Output Urine Total 1550 ml 1200 ml











Microbiology








 Date/Time


Source Procedure


Growth Status


 


 


 3/15/17 21:00


Urine,Clean Catch Urine Culture - Final


Escherichia Coli Complete








Current Medications








 Medications


  (Trade)  Dose


 Ordered  Sig/Ashwini


 Route


 PRN Reason  Start Time


 Stop Time Status Last Admin


Dose Admin


 


 Acetaminophen


  (Tylenol)  500 mg  Q6H  PRN


 ORAL


 Mild Pain/Temp > 100.5  3/15/17 05:30


 4/14/17 05:29   


 


 


 Acetaminophen/


 Hydrocodone Bitart


  (Norco 10/325)  1 ea  Q4H  PRN


 ORAL


 For Pain  3/15/17 05:30


 3/22/17 05:29  3/19/17 08:47


 


 


 Al Hydroxide/Mg


 Hydroxide


  (Mylanta)  30 ml  TIDPRN  PRN


 ORAL


 INDIGESTION/DYSPEPSIA  3/16/17 21:30


 4/15/17 21:29  3/16/17 21:57


 


 


 Albuterol Sulfate


 2.5 mg  2.5 mg  Q4HRT


 [facility]


   3/16/17 11:00


 3/21/17 10:59  3/19/17 03:57


 


 


 Atorvastatin


 Calcium


  (Lipitor)  40 mg  DAILY


 ORAL


   3/15/17 09:00


 4/14/17 08:59  3/19/17 08:46


 


 


 Beclomethasone


 Dipropionate


  (Qvar 40 Inhaler)  1 puff  BEDTIME


 INH


   3/15/17 21:00


 4/14/17 20:59   


 


 


 Budesonide


  (Pulmicort)  0.25 mg  EVERY 12  HOURS


 [facility]


   3/15/17 21:00


 4/14/17 20:59  3/19/17 09:28


 


 


 Cefepime HCl/


 Dextrose


  (Maxipime/D5W)  55 ml @ 


 110 mls/hr  DAILY


 IVPB


   3/16/17 15:00


 3/23/17 14:59  3/18/17 10:07


 


 


 Docusate Sodium


  (Colace)  100 mg  DAILY


 ORAL


   3/15/17 09:00


 4/14/17 08:59  3/19/17 08:45


 


 


 Fluticasone


 Propionate 1 spray  1 spray  DAILY


 NASAL


   3/15/17 09:00


 4/14/17 08:59  3/18/17 09:18


 


 


 Gabapentin


  (Neurontin)  600 mg  THREE TIMES A  DAY


 ORAL


   3/15/17 09:00


 4/14/17 08:59  3/19/17 08:45


 


 


 Glimepiride


  (Amaryl)  1 mg  BEFORE  BREAKFAST


 ORAL


   3/15/17 06:30


 4/14/17 06:29  3/19/17 05:55


 


 


 Hydrochlorothiazide


  (Hydrodiuril)  25 mg  DAILY


 ORAL


   3/15/17 09:00


 4/14/17 08:59  3/19/17 08:46


 


 


 Hydromorphone HCl


  (Dilaudid)  1 mg  Q6H  PRN


 IVP


 Severe Pain (Pain Scale 7-10)  3/15/17 05:30


 3/22/17 05:29  3/18/17 00:35


 


 


 Lisinopril


  (Zestril)  5 mg  DAILY


 ORAL


   3/15/17 09:00


 4/14/17 08:59  3/19/17 08:46


 


 


 Nifedipine


  (Procardia XL)  30 mg  DAILY


 ORAL


   3/15/17 09:00


 4/14/17 08:59  3/19/17 08:46


 


 


 Ondansetron HCl


  (Zofran)  4 mg  Q6H  PRN


 IVP


 Nausea & Vomiting  3/15/17 05:30


 4/14/17 05:29   


 


 


 Pantoprazole


  (Protonix)  40 mg  DAILY


 ORAL


   3/17/17 09:00


 4/16/17 08:59  3/19/17 08:44


 


 


 Potassium


 Chloride/Sodium


 Chloride


  (KCl/0.45% NS


 1000ml)  1,015 ml @ 


 65 mls/hr  [id number]


 IV


   3/15/17 08:00


 4/14/17 07:59  3/19/17 04:03


 


 


 Zolpidem Tartrate


  (Ambien)  5 mg  HSPRN  PRN


 ORAL


 Insomnia  3/16/17 21:15


 4/15/17 21:14  3/18/17 02:13


 





Laboratory Tests


3/18/17 13:25: 


White Blood Count 6.1, Red Blood Count 4.55, Hemoglobin 12.6, Hematocrit 39.3, 

Mean Corpuscular Volume 86, Mean Corpuscular Hemoglobin 27.7, Mean Corpuscular 

Hemoglobin Concent 32.1, Red Cell Distribution Width 13.2, Platelet Count 264, 

Mean Platelet Volume 9.9, Neutrophils (%) (Auto) 55.4, Lymphocytes (%) (Auto) 

33.1, Monocytes (%) (Auto) 8.2, Eosinophils (%) (Auto) 2.1, Basophils (%) (Auto

) 1.2, Sodium Level 138, Potassium Level 4.6, Chloride Level 95L, Carbon 

Dioxide Level 26, Anion Gap 17H, Blood Urea Nitrogen 16, Creatinine 1.2H, 

Estimat Glomerular Filtration Rate 54.3, Glucose Level 119H, Calcium Level 9.8


Height (Feet):  5


Height (Inches):  5.00


Weight (Pounds):  220


Objective


 exam stable











FAN RICHMOND Mar 19, 2017 09:56

## 2017-03-19 NOTE — GENERAL PROGRESS NOTE
Assessment/Plan


Assessment/Plan


1) left renal calculus with mild to moderate hydronephrosis and hematuria  s/p 

litho and stent


2) KATHLEEN--resolved


3) Lung nodule


4) HTN


5) DM


6) COPD


7) peripheral neuropathy





Plan


monitor CBC


f/u 


continue Abx


Discussed with pt and RN





Subjective


Allergies:  


Coded Allergies:  


     DIAZEPAM (Verified  Allergy, Mild, Itching, 12/13/15)


     DOBUTAMINE (Verified  Allergy, Unknown, 12/13/15)


Subjective


Arguello was removed. small stone was passed last night. Hematuria is improved





Objective





Last 24 Hour Vital Signs








  Date Time  Temp Pulse Resp B/P Pulse Ox O2 Delivery O2 Flow Rate FiO2


 


3/19/17 09:39  74 20  100 Nasal Cannula 2.0 28


 


3/19/17 09:29  73 20  96 Nasal Cannula 2.0 28


 


3/19/17 08:46  92  140/80    


 


3/19/17 08:46    140/80    


 


3/19/17 08:00 97.3 92 20 140/80 100 Room Air  


 


3/19/17 07:48      Nasal Cannula  


 


3/19/17 07:47      Nasal Cannula  


 


3/19/17 07:47      Nasal Cannula  


 


3/19/17 05:03 97.9       


 


3/19/17 04:18 97.9 77 18 142/73 95 Nasal Cannula 2.0 


 


3/19/17 03:59  72 18  95 Nasal Cannula 2.0 28


 


3/19/17 03:47  70 18  93 Nasal Cannula 2.0 28


 


3/19/17 00:00 97.9 84 20 129/71 99  2.0 


 


3/18/17 23:55  77 18  97 Nasal Cannula 2.0 28


 


3/18/17 23:45  75 18  95 Nasal Cannula 2.0 28


 


3/18/17 20:04      Room Air  


 


3/18/17 20:00 97.3 79 20 132/69 97 Nasal Cannula 2.0 


 


3/18/17 19:55      Nasal Cannula 2.0 28


 


3/18/17 19:55      Nasal Cannula  


 


3/18/17 19:55     94 Nasal Cannula 2.0 28


 


3/18/17 19:54  72 20  94 Nasal Cannula 2.0 28


 


3/18/17 19:54        28


 


3/18/17 19:05  74 18  97 Nasal Cannula 2.0 28


 


3/18/17 16:00 97.7 71 20 140/75 96 Nasal Cannula 2.0 


 


3/18/17 15:49  66 16  99 Nasal Cannula 2.0 


 


3/18/17 15:39  66 18  95 Nasal Cannula 2.0 


 


3/18/17 12:00 97.3 72 20 109/68 95 Nasal Cannula 2.0 


 


3/18/17 11:02  71 18  97 Nasal Cannula 2.0 28


 


3/18/17 10:52  71 18  94 Nasal Cannula 2.0 28


 


3/18/17 10:18  68 22  96   

















Intake and Output  


 


 3/18/17 3/19/17





 19:00 07:00


 


Intake Total 890 ml 1440 ml


 


Output Total 1550 ml 1200 ml


 


Balance -660 ml 240 ml


 


  


 


Intake Oral 600 ml 660 ml


 


IV Total 290 ml 780 ml


 


Output Urine Total 1550 ml 1200 ml








Laboratory Tests


3/18/17 13:25: 


White Blood Count 6.1, Red Blood Count 4.55, Hemoglobin 12.6, Hematocrit 39.3, 

Mean Corpuscular Volume 86, Mean Corpuscular Hemoglobin 27.7, Mean Corpuscular 

Hemoglobin Concent 32.1, Red Cell Distribution Width 13.2, Platelet Count 264, 

Mean Platelet Volume 9.9, Neutrophils (%) (Auto) 55.4, Lymphocytes (%) (Auto) 

33.1, Monocytes (%) (Auto) 8.2, Eosinophils (%) (Auto) 2.1, Basophils (%) (Auto

) 1.2, Sodium Level 138, Potassium Level 4.6, Chloride Level 95L, Carbon 

Dioxide Level 26, Anion Gap 17H, Blood Urea Nitrogen 16, Creatinine 1.2H, 

Estimat Glomerular Filtration Rate 54.3, Glucose Level 119H, Calcium Level 9.8


Height (Feet):  5


Height (Inches):  5.00


Weight (Pounds):  220


Objective


NAD, AAOx3


CTA b/l,no rales, no rhonchi


S1,S2,RRR, no M/R/G


soft, non tender, BS+


no edema,











OLSON,MESSI Mar 19, 2017 10:14

## 2017-03-19 NOTE — PULMONOLOGY PROGRESS NOTE
Assessment/Plan


Assessment/Plan





IMPRESSION:


1. Renal stone with associated hydronephrosis, associated flank pain.


2. Asthma, clinically stable.


3. Mild chest tightness and cough.


4. Chronic rhinitis.


5. Hypercholesterolemia.








PLAN


care noted


same RX as is


follow up clinically 


no change for now and no other changes noted


monitor as is and will adjust if needed


additional therapy if needed


all care reviewed


patient updated





impression, plan, and exam edited and reviewed in detail


care discussed with RN





Subjective


ROS Limited/Unobtainable:  Yes


Allergies:  


Coded Allergies:  


     DIAZEPAM (Verified  Allergy, Mild, Itching, 12/13/15)


     DOBUTAMINE (Verified  Allergy, Unknown, 12/13/15)


Subjective


care noted and discussed 


mild cough and congestion





Objective





Last 24 Hour Vital Signs








  Date Time  Temp Pulse Resp B/P Pulse Ox O2 Delivery O2 Flow Rate FiO2


 


3/19/17 08:46  92  140/80    


 


3/19/17 08:46    140/80    


 


3/19/17 08:00 97.3 92 20 140/80 100 Room Air  


 


3/19/17 07:48      Nasal Cannula  


 


3/19/17 07:47      Nasal Cannula  


 


3/19/17 07:47      Nasal Cannula  


 


3/19/17 05:03 97.9       


 


3/19/17 04:18 97.9 77 18 142/73 95 Nasal Cannula 2.0 


 


3/19/17 03:59  72 18  95 Nasal Cannula 2.0 28


 


3/19/17 03:47  70 18  93 Nasal Cannula 2.0 28


 


3/19/17 00:00 97.9 84 20 129/71 99  2.0 


 


3/18/17 23:55  77 18  97 Nasal Cannula 2.0 28


 


3/18/17 23:45  75 18  95 Nasal Cannula 2.0 28


 


3/18/17 20:04      Room Air  


 


3/18/17 20:00 97.3 79 20 132/69 97 Nasal Cannula 2.0 


 


3/18/17 19:55      Nasal Cannula 2.0 28


 


3/18/17 19:55      Nasal Cannula  


 


3/18/17 19:55     94 Nasal Cannula 2.0 28


 


3/18/17 19:54  72 20  94 Nasal Cannula 2.0 28


 


3/18/17 19:54        28


 


3/18/17 19:05  74 18  97 Nasal Cannula 2.0 28


 


3/18/17 16:00 97.7 71 20 140/75 96 Nasal Cannula 2.0 


 


3/18/17 15:49  66 16  99 Nasal Cannula 2.0 


 


3/18/17 15:39  66 18  95 Nasal Cannula 2.0 


 


3/18/17 12:00 97.3 72 20 109/68 95 Nasal Cannula 2.0 


 


3/18/17 11:02  71 18  97 Nasal Cannula 2.0 28


 


3/18/17 10:52  71 18  94 Nasal Cannula 2.0 28


 


3/18/17 10:18  68 22  96   


 


3/18/17 09:20  75 20  98 Nasal Cannula 2.0 28


 


3/18/17 09:13    116/74    


 


3/18/17 09:10  73 20  96 Nasal Cannula 2.0 28

















Intake and Output  


 


 3/18/17 3/19/17





 19:00 07:00


 


Intake Total 890 ml 1440 ml


 


Output Total 1550 ml 1200 ml


 


Balance -660 ml 240 ml


 


  


 


Intake Oral 600 ml 660 ml


 


IV Total 290 ml 780 ml


 


Output Urine Total 1550 ml 1200 ml








Objective


HEENT:  Overall negative, otherwise oropharynx is clear.  No thrush.


NECK:  No jugular venous distention.


LUNGS:  Good air entry.  some wheezes and rhonchi


CARDIAC:  Normal S1 and S2.  Regular, rate, and rhythm.  Soft


systolic murmur, left parasternal border.  No rubs or gallops.


ABDOMEN:  Soft and nontender.  No flank tenderness.


EXTREMITIES:  No cyanosis or clubbing.  There is trace edema.


NEUROLOGIC:  Grossly nonfocal.


Laboratory Tests


3/18/17 13:25: 


White Blood Count 6.1, Red Blood Count 4.55, Hemoglobin 12.6, Hematocrit 39.3, 

Mean Corpuscular Volume 86, Mean Corpuscular Hemoglobin 27.7, Mean Corpuscular 

Hemoglobin Concent 32.1, Red Cell Distribution Width 13.2, Platelet Count 264, 

Mean Platelet Volume 9.9, Neutrophils (%) (Auto) 55.4, Lymphocytes (%) (Auto) 

33.1, Monocytes (%) (Auto) 8.2, Eosinophils (%) (Auto) 2.1, Basophils (%) (Auto

) 1.2, Sodium Level 138, Potassium Level 4.6, Chloride Level 95L, Carbon 

Dioxide Level 26, Anion Gap 17H, Blood Urea Nitrogen 16, Creatinine 1.2H, 

Estimat Glomerular Filtration Rate 54.3, Glucose Level 119H, Calcium Level 9.8





Current Medications








 Medications


  (Trade)  Dose


 Ordered  Sig/Ashwini


 Route


 PRN Reason  Start Time


 Stop Time Status Last Admin


Dose Admin


 


 Acetaminophen


  (Tylenol)  500 mg  Q6H  PRN


 ORAL


 Mild Pain/Temp > 100.5  3/15/17 05:30


 4/14/17 05:29   


 


 


 Acetaminophen/


 Hydrocodone Bitart


  (Norco 10/325)  1 ea  Q4H  PRN


 ORAL


 For Pain  3/15/17 05:30


 3/22/17 05:29  3/19/17 08:47


 


 


 Al Hydroxide/Mg


 Hydroxide


  (Mylanta)  30 ml  TIDPRN  PRN


 ORAL


 INDIGESTION/DYSPEPSIA  3/16/17 21:30


 4/15/17 21:29  3/16/17 21:57


 


 


 Albuterol Sulfate


 2.5 mg  2.5 mg  Q4HRT


 HHN


   3/16/17 11:00


 3/21/17 10:59  3/19/17 03:57


 


 


 Atorvastatin


 Calcium


  (Lipitor)  40 mg  DAILY


 ORAL


   3/15/17 09:00


 4/14/17 08:59  3/19/17 08:46


 


 


 Beclomethasone


 Dipropionate


  (Qvar 40 Inhaler)  1 puff  BEDTIME


 INH


   3/15/17 21:00


 4/14/17 20:59   


 


 


 Budesonide


  (Pulmicort)  0.25 mg  EVERY 12  HOURS


 HHN


   3/15/17 21:00


 4/14/17 20:59  3/18/17 19:51


 


 


 Cefepime HCl/


 Dextrose


  (Maxipime/D5W)  55 ml @ 


 110 mls/hr  DAILY


 IVPB


   3/16/17 15:00


 3/23/17 14:59  3/18/17 10:07


 


 


 Docusate Sodium


  (Colace)  100 mg  DAILY


 ORAL


   3/15/17 09:00


 4/14/17 08:59  3/19/17 08:45


 


 


 Fluticasone


 Propionate 1 spray  1 spray  DAILY


 NASAL


   3/15/17 09:00


 4/14/17 08:59  3/18/17 09:18


 


 


 Gabapentin


  (Neurontin)  600 mg  THREE TIMES A  DAY


 ORAL


   3/15/17 09:00


 4/14/17 08:59  3/19/17 08:45


 


 


 Glimepiride


  (Amaryl)  1 mg  BEFORE  BREAKFAST


 ORAL


   3/15/17 06:30


 4/14/17 06:29  3/19/17 05:55


 


 


 Hydrochlorothiazide


  (Hydrodiuril)  25 mg  DAILY


 ORAL


   3/15/17 09:00


 4/14/17 08:59  3/19/17 08:46


 


 


 Hydromorphone HCl


  (Dilaudid)  1 mg  Q6H  PRN


 IVP


 Severe Pain (Pain Scale 7-10)  3/15/17 05:30


 3/22/17 05:29  3/18/17 00:35


 


 


 Lisinopril


  (Zestril)  5 mg  DAILY


 ORAL


   3/15/17 09:00


 4/14/17 08:59  3/19/17 08:46


 


 


 Nifedipine


  (Procardia XL)  30 mg  DAILY


 ORAL


   3/15/17 09:00


 4/14/17 08:59  3/19/17 08:46


 


 


 Ondansetron HCl


  (Zofran)  4 mg  Q6H  PRN


 IVP


 Nausea & Vomiting  3/15/17 05:30


 4/14/17 05:29   


 


 


 Pantoprazole


  (Protonix)  40 mg  DAILY


 ORAL


   3/17/17 09:00


 4/16/17 08:59  3/19/17 08:44


 


 


 Potassium


 Chloride/Sodium


 Chloride


  (KCl/0.45% NS


 1000ml)  1,015 ml @ 


 65 mls/hr  [id number]


 IV


   3/15/17 08:00


 4/14/17 07:59  3/19/17 04:03


 


 


 Zolpidem Tartrate


  (Ambien)  5 mg  HSPRN  PRN


 ORAL


 Insomnia  3/16/17 21:15


 4/15/17 21:14  3/18/17 02:13


 

















ETHEL FERNANDEZ Mar 19, 2017 08:56

## 2017-03-20 VITALS — SYSTOLIC BLOOD PRESSURE: 144 MMHG | DIASTOLIC BLOOD PRESSURE: 69 MMHG

## 2017-03-20 VITALS — DIASTOLIC BLOOD PRESSURE: 66 MMHG | SYSTOLIC BLOOD PRESSURE: 129 MMHG

## 2017-03-20 VITALS — DIASTOLIC BLOOD PRESSURE: 71 MMHG | SYSTOLIC BLOOD PRESSURE: 134 MMHG

## 2017-03-20 VITALS — DIASTOLIC BLOOD PRESSURE: 55 MMHG | SYSTOLIC BLOOD PRESSURE: 124 MMHG

## 2017-03-20 VITALS — SYSTOLIC BLOOD PRESSURE: 136 MMHG | DIASTOLIC BLOOD PRESSURE: 65 MMHG

## 2017-03-20 VITALS — DIASTOLIC BLOOD PRESSURE: 72 MMHG | SYSTOLIC BLOOD PRESSURE: 137 MMHG

## 2017-03-20 LAB
ANION GAP SERPL CALC-SCNC: 15 MMOL/L (ref 5–15)
BASOPHILS NFR BLD AUTO: 1.6 % (ref 0–2)
CALCIUM SERPL-MCNC: 9 MG/DL (ref 8.6–10.2)
CHLORIDE SERPL-SCNC: 98 MEQ/L (ref 98–107)
CO2 SERPL-SCNC: 26 MEQ/L (ref 20–30)
CREAT SERPL-MCNC: 1 MG/DL (ref 0.5–0.9)
EOSINOPHIL NFR BLD AUTO: 3.8 % (ref 0–3)
ERYTHROCYTE [DISTWIDTH] IN BLOOD BY AUTOMATED COUNT: 13.1 % (ref 11.6–14.8)
GFR SERPLBLD BASED ON 1.73 SQ M-ARVRAT: > 60 ML/MIN (ref 60–?)
HEMOLYSIS: 14
LYMPHOCYTES NFR BLD AUTO: 36.8 % (ref 20–45)
MCH RBC QN AUTO: 27.6 PG (ref 27–31)
MCHC RBC AUTO-ENTMCNC: 32.6 G/DL (ref 32–36)
MCV RBC AUTO: 85 FL (ref 80–99)
MONOCYTES NFR BLD AUTO: 10.1 % (ref 1–10)
NEUTROPHILS NFR BLD AUTO: 47.8 % (ref 45–75)
PLATELET # BLD: 169 K/UL (ref 150–450)
PMV BLD AUTO: 8.3 FL (ref 6.5–10.1)
POTASSIUM SERPL-SCNC: 4.2 MEQ/L (ref 3.4–4.9)
RBC # BLD AUTO: 4.43 M/UL (ref 4.2–5.4)
SODIUM SERPL-SCNC: 139 MEQ/L (ref 135–145)
WBC # BLD AUTO: 5.4 K/UL (ref 4.8–10.8)

## 2017-03-20 RX ADMIN — SODIUM CHLORIDE SCH MLS/HR: 0.9 INJECTION INTRAVENOUS at 20:04

## 2017-03-20 RX ADMIN — SODIUM CHLORIDE SCH MLS/HR: 0.9 INJECTION INTRAVENOUS at 08:58

## 2017-03-20 RX ADMIN — ALBUTEROL SULFATE SCH MG: 2.5 SOLUTION RESPIRATORY (INHALATION) at 08:12

## 2017-03-20 RX ADMIN — HYDROCODONE BITARTRATE AND ACETAMINOPHEN PRN EA: 10; 325 TABLET ORAL at 00:36

## 2017-03-20 RX ADMIN — DOCUSATE SODIUM SCH MG: 100 TABLET ORAL at 09:01

## 2017-03-20 RX ADMIN — FLUTICASONE PROPIONATE SCH SPRAY: 50 SPRAY, METERED NASAL at 09:04

## 2017-03-20 RX ADMIN — BUDESONIDE SCH MG: 0.25 SUSPENSION RESPIRATORY (INHALATION) at 20:16

## 2017-03-20 RX ADMIN — ZOLPIDEM TARTRATE PRN MG: 5 TABLET ORAL at 22:08

## 2017-03-20 RX ADMIN — SODIUM CHLORIDE SCH MLS/HR: 900 INJECTION, SOLUTION INTRAVENOUS at 08:58

## 2017-03-20 RX ADMIN — BUDESONIDE SCH MG: 0.25 SUSPENSION RESPIRATORY (INHALATION) at 08:12

## 2017-03-20 RX ADMIN — HYDROCODONE BITARTRATE AND ACETAMINOPHEN PRN EA: 10; 325 TABLET ORAL at 09:05

## 2017-03-20 RX ADMIN — GLIMEPIRIDE SCH MG: 1 TABLET ORAL at 06:39

## 2017-03-20 RX ADMIN — ALBUTEROL SULFATE SCH MG: 2.5 SOLUTION RESPIRATORY (INHALATION) at 20:16

## 2017-03-20 RX ADMIN — ALBUTEROL SULFATE SCH MG: 2.5 SOLUTION RESPIRATORY (INHALATION) at 12:04

## 2017-03-20 RX ADMIN — HYDROCODONE BITARTRATE AND ACETAMINOPHEN PRN EA: 10; 325 TABLET ORAL at 17:07

## 2017-03-20 RX ADMIN — ZOLPIDEM TARTRATE PRN MG: 5 TABLET ORAL at 00:36

## 2017-03-20 RX ADMIN — ALBUTEROL SULFATE SCH MG: 2.5 SOLUTION RESPIRATORY (INHALATION) at 03:50

## 2017-03-20 RX ADMIN — ALBUTEROL SULFATE SCH MG: 2.5 SOLUTION RESPIRATORY (INHALATION) at 22:24

## 2017-03-20 RX ADMIN — ALBUTEROL SULFATE SCH MG: 2.5 SOLUTION RESPIRATORY (INHALATION) at 15:34

## 2017-03-20 RX ADMIN — LISINOPRIL SCH MG: 2.5 TABLET ORAL at 09:03

## 2017-03-20 NOTE — UROLOGY PROGRESS NOTE
Assessment/Plan


Assessment/Plan


1. Nephrolithiasis with colic.


2. Left-sided hydronephrosis.


3. Mild acute kidney injury, better.


4. Hematuria.


5. History of bladder tumor.


6. POD # 3, ureteroscopy/litho/stent





monitor clinically


abx


monitor renal fxn





Subjective


Allergies:  


Coded Allergies:  


     DIAZEPAM (Verified  Allergy, Mild, Itching, 12/13/15)


     DOBUTAMINE (Verified  Allergy, Unknown, 12/13/15)


Subjective


all noted, feels fair





Objective





Last 24 Hour Vital Signs








  Date Time  Temp Pulse Resp B/P Pulse Ox O2 Delivery O2 Flow Rate FiO2


 


3/20/17 10:02 97.5       


 


3/20/17 10:02 97.5       


 


3/20/17 09:03    124/55    


 


3/20/17 09:01  85  124/55    


 


3/20/17 08:15  85 16  100 Nasal Cannula 2.0 28


 


3/20/17 08:06 97.5 66 19 124/55 96 Nasal Cannula 2.0 


 


3/20/17 08:05  80 16  100 Nasal Cannula 2.0 28


 


3/20/17 08:05        28


 


3/20/17 07:56      Nasal Cannula 3.0 32


 


3/20/17 07:54  80 16  100 Nasal Cannula 3.0 32


 


3/20/17 07:45  80 16  98 Nasal Cannula 3.0 32


 


3/20/17 07:44     98 Nasal Cannula 3.0 32


 


3/20/17 04:00 97.7 84 18 134/71 95 Nasal Cannula 2.0 


 


3/20/17 03:59  77 14  99 Nasal Cannula 3.0 32


 


3/20/17 03:50  76 14  96 Nasal Cannula 3.0 32


 


3/20/17 00:00 97.0 76 18 129/66 96 Nasal Cannula 2.0 


 


3/19/17 23:37  75 14  99 Nasal Cannula 3.0 32


 


3/19/17 23:30  75 14  100 Nasal Cannula 3.0 32


 


3/19/17 21:26  72 16  100 Nasal Cannula 2.0 28


 


3/19/17 21:17        28


 


3/19/17 21:17  88 16  98 Nasal Cannula 2.0 28


 


3/19/17 20:00 97.9 86 19 133/52 92 Nasal Cannula 2.0 


 


3/19/17 19:21  79 14  99 Nasal Cannula 3.0 32


 


3/19/17 19:13      Nasal Cannula 3.0 32


 


3/19/17 19:12     94 Nasal Cannula 3.0 32


 


3/19/17 19:12  75 14  94 Nasal Cannula 3.0 32


 


3/19/17 19:00  75 16   Nasal Cannula 2.0 28


 


3/19/17 16:23 97.7 85 16 136/56 98 Room Air  


 


3/19/17 15:04  66 18  98 Nasal Cannula 3.0 32


 


3/19/17 14:54  68 18  91 Nasal Cannula 3.0 32


 


3/19/17 12:00 97.2  18 120/69 97 Room Air  


 


3/19/17 11:50  66 18  98 Nasal Cannula 3.0 32


 


3/19/17 11:45  68 18  91 Nasal Cannula 3.0 32

















Intake and Output  


 


 3/19/17 3/20/17





 19:00 07:00


 


Intake Total 1080 ml 1100 ml


 


Balance 1080 ml 1100 ml


 


  


 


Intake Oral 300 ml 450 ml


 


IV Total 780 ml 650 ml


 


# Voids 7 3











Microbiology








 Date/Time


Source Procedure


Growth Status


 


 


 3/15/17 21:00


Urine,Clean Catch Urine Culture - Final


Escherichia Coli Complete








Current Medications








 Medications


  (Trade)  Dose


 Ordered  Sig/Ashwini


 Route


 PRN Reason  Start Time


 Stop Time Status Last Admin


Dose Admin


 


 Acetaminophen


  (Tylenol)  500 mg  Q6H  PRN


 ORAL


 Mild Pain/Temp > 100.5  3/15/17 05:30


 4/14/17 05:29   


 


 


 Acetaminophen/


 Hydrocodone Bitart


  (Norco 10/325)  1 ea  Q4H  PRN


 ORAL


 For Pain  3/15/17 05:30


 3/22/17 05:29  3/20/17 09:05


 


 


 Al Hydroxide/Mg


 Hydroxide


  (Mylanta)  30 ml  TIDPRN  PRN


 ORAL


 INDIGESTION/DYSPEPSIA  3/16/17 21:30


 4/15/17 21:29  3/16/17 21:57


 


 


 Albuterol Sulfate


 2.5 mg  2.5 mg  Q4HRT


 N


   3/16/17 11:00


 3/21/17 10:59  3/20/17 08:12


 


 


 Atorvastatin


 Calcium


  (Lipitor)  40 mg  DAILY


 ORAL


   3/15/17 09:00


 4/14/17 08:59  3/20/17 09:01


 


 


 Budesonide


  (Pulmicort)  0.25 mg  EVERY 12  HOURS


 N


   3/15/17 21:00


 4/14/17 20:59  3/20/17 08:12


 


 


 Cefepime HCl/


 Dextrose


  (Maxipime/D5W)  55 ml @ 


 110 mls/hr  DAILY


 IVPB


   3/16/17 15:00


 3/23/17 14:59  3/20/17 08:58


 


 


 Docusate Sodium


  (Colace)  100 mg  DAILY


 ORAL


   3/15/17 09:00


 4/14/17 08:59  3/20/17 09:01


 


 


 Fluticasone


 Propionate 1 spray  1 spray  DAILY


 NASAL


   3/15/17 09:00


 4/14/17 08:59  3/20/17 09:04


 


 


 Gabapentin


  (Neurontin)  600 mg  THREE TIMES A  DAY


 ORAL


   3/15/17 09:00


 4/14/17 08:59  3/20/17 09:03


 


 


 Glimepiride


  (Amaryl)  1 mg  BEFORE  BREAKFAST


 ORAL


   3/15/17 06:30


 4/14/17 06:29  3/20/17 06:39


 


 


 Hydrochlorothiazide


  (Hydrodiuril)  25 mg  DAILY


 ORAL


   3/15/17 09:00


 4/14/17 08:59  3/20/17 09:01


 


 


 Hydromorphone HCl


  (Dilaudid)  1 mg  Q6H  PRN


 IVP


 Severe Pain (Pain Scale 7-10)  3/15/17 05:30


 3/22/17 05:29  3/18/17 00:35


 


 


 Lisinopril


  (Zestril)  5 mg  DAILY


 ORAL


   3/15/17 09:00


 4/14/17 08:59  3/20/17 09:03


 


 


 Nifedipine


  (Procardia XL)  30 mg  DAILY


 ORAL


   3/15/17 09:00


 4/14/17 08:59  3/20/17 09:01


 


 


 Ondansetron HCl


  (Zofran)  4 mg  Q6H  PRN


 IVP


 Nausea & Vomiting  3/15/17 05:30


 4/14/17 05:29   


 


 


 Pantoprazole


  (Protonix)  40 mg  DAILY


 ORAL


   3/17/17 09:00


 4/16/17 08:59  3/20/17 09:01


 


 


 Potassium


 Chloride/Sodium


 Chloride


  (KCl/0.45% NS


 1000ml)  1,015 ml @ 


 65 mls/hr  [id number]


 IV


   3/15/17 08:00


 4/14/17 07:59  3/20/17 08:58


 


 


 Zolpidem Tartrate


  (Ambien)  5 mg  HSPRN  PRN


 ORAL


 Insomnia  3/16/17 21:15


 4/15/17 21:14  3/20/17 00:36


 





Laboratory Tests


3/20/17 04:55: 


White Blood Count 5.4, Red Blood Count 4.43, Hemoglobin 12.2, Hematocrit 37.5, 

Mean Corpuscular Volume 85, Mean Corpuscular Hemoglobin 27.6, Mean Corpuscular 

Hemoglobin Concent 32.6, Red Cell Distribution Width 13.1, Platelet Count 169, 

Mean Platelet Volume 8.3, Neutrophils (%) (Auto) 47.8, Lymphocytes (%) (Auto) 

36.8, Monocytes (%) (Auto) 10.1H, Eosinophils (%) (Auto) 3.8H, Basophils (%) (

Auto) 1.6, Sodium Level 139, Potassium Level 4.2, Chloride Level 98, Carbon 

Dioxide Level 26, Anion Gap 15, Blood Urea Nitrogen 17, Creatinine 1.0H, 

Estimat Glomerular Filtration Rate > 60, Glucose Level 122H, Calcium Level 9.0


Height (Feet):  5


Height (Inches):  5.00


Weight (Pounds):  220


Objective


 exam stable











FAN RICHMOND Mar 20, 2017 11:38

## 2017-03-20 NOTE — PULMONOLOGY PROGRESS NOTE
Assessment/Plan


Assessment/Plan





IMPRESSION:


1. Renal stone with associated hydronephrosis, associated flank pain.


2. Asthma, clinically stable.


3. Mild chest tightness and cough.


4. Chronic rhinitis.


5. Hypercholesterolemia.








PLAN


no change in exam


care noted


same RX per pulmonary for now


follow up clinically 


no change for now 


monitor for change


urology clearance


additional therapy if needed


all care reviewed


patient updated





impression, plan, and exam edited and reviewed in detail


care discussed with RN





Subjective


Allergies:  


Coded Allergies:  


     DIAZEPAM (Verified  Allergy, Mild, Itching, 12/13/15)


     DOBUTAMINE (Verified  Allergy, Unknown, 12/13/15)


Subjective


care noted and discussed 


minimal cough and congestion





Objective





Last 24 Hour Vital Signs








  Date Time  Temp Pulse Resp B/P Pulse Ox O2 Delivery O2 Flow Rate FiO2


 


3/20/17 08:15  85 16  100 Nasal Cannula 2.0 28


 


3/20/17 08:06 97.5 66 19 124/55 96 Nasal Cannula 2.0 


 


3/20/17 08:05  80 16  100 Nasal Cannula 2.0 28


 


3/20/17 08:05        28


 


3/20/17 07:56      Nasal Cannula 3.0 32


 


3/20/17 07:54  80 16  100 Nasal Cannula 3.0 32


 


3/20/17 07:45  80 16  98 Nasal Cannula 3.0 32


 


3/20/17 07:44     98 Nasal Cannula 3.0 32


 


3/20/17 04:00 97.7 84 18 134/71 95 Nasal Cannula 2.0 


 


3/20/17 03:59  77 14  99 Nasal Cannula 3.0 32


 


3/20/17 03:50  76 14  96 Nasal Cannula 3.0 32


 


3/20/17 01:35 97.9       


 


3/20/17 00:00 97.0 76 18 129/66 96 Nasal Cannula 2.0 


 


3/19/17 23:37  75 14  99 Nasal Cannula 3.0 32


 


3/19/17 23:30  75 14  100 Nasal Cannula 3.0 32


 


3/19/17 21:26  72 16  100 Nasal Cannula 2.0 28


 


3/19/17 21:17        28


 


3/19/17 21:17  88 16  98 Nasal Cannula 2.0 28


 


3/19/17 20:00 97.9 86 19 133/52 92 Nasal Cannula 2.0 


 


3/19/17 19:21  79 14  99 Nasal Cannula 3.0 32


 


3/19/17 19:13      Nasal Cannula 3.0 32


 


3/19/17 19:12     94 Nasal Cannula 3.0 32


 


3/19/17 19:12  75 14  94 Nasal Cannula 3.0 32


 


3/19/17 19:00  75 16   Nasal Cannula 2.0 28


 


3/19/17 16:23 97.7 85 16 136/56 98 Room Air  


 


3/19/17 15:04  66 18  98 Nasal Cannula 3.0 32


 


3/19/17 14:54  68 18  91 Nasal Cannula 3.0 32


 


3/19/17 12:00 97.2  18 120/69 97 Room Air  


 


3/19/17 11:50  66 18  98 Nasal Cannula 3.0 32


 


3/19/17 11:45  68 18  91 Nasal Cannula 3.0 32


 


3/19/17 09:39  74 20  100 Nasal Cannula 2.0 28


 


3/19/17 09:29  73 20  96 Nasal Cannula 2.0 28


 


3/19/17 08:46  92  140/80    


 


3/19/17 08:46    140/80    

















Intake and Output  


 


 3/19/17 3/20/17





 19:00 07:00


 


Intake Total 1080 ml 1100 ml


 


Balance 1080 ml 1100 ml


 


  


 


Intake Oral 300 ml 450 ml


 


IV Total 780 ml 650 ml


 


# Voids 7 3








Objective


HEENT:  Overall negative, otherwise oropharynx is clear.  No thrush.


NECK:  No jugular venous distention.


LUNGS:  Good air entry.  some wheezes and rhonchi


CARDIAC:  Normal S1 and S2.  Regular, rate, and rhythm.  Soft


systolic murmur, left parasternal border.  No rubs or gallops.


ABDOMEN:  Soft and nontender.  No flank tenderness.


EXTREMITIES:  No cyanosis or clubbing.  There is trace edema.


NEUROLOGIC:  Grossly nonfocal.


Laboratory Tests


3/20/17 04:55: 


White Blood Count 5.4, Red Blood Count 4.43, Hemoglobin 12.2, Hematocrit 37.5, 

Mean Corpuscular Volume 85, Mean Corpuscular Hemoglobin 27.6, Mean Corpuscular 

Hemoglobin Concent 32.6, Red Cell Distribution Width 13.1, Platelet Count 169, 

Mean Platelet Volume 8.3, Neutrophils (%) (Auto) 47.8, Lymphocytes (%) (Auto) 

36.8, Monocytes (%) (Auto) 10.1H, Eosinophils (%) (Auto) 3.8H, Basophils (%) (

Auto) 1.6, Sodium Level 139, Potassium Level 4.2, Chloride Level 98, Carbon 

Dioxide Level 26, Anion Gap 15, Blood Urea Nitrogen 17, Creatinine 1.0H, 

Estimat Glomerular Filtration Rate > 60, Glucose Level 122H, Calcium Level 9.0





Current Medications








 Medications


  (Trade)  Dose


 Ordered  Sig/Ashwini


 Route


 PRN Reason  Start Time


 Stop Time Status Last Admin


Dose Admin


 


 Acetaminophen


  (Tylenol)  500 mg  Q6H  PRN


 ORAL


 Mild Pain/Temp > 100.5  3/15/17 05:30


 4/14/17 05:29   


 


 


 Acetaminophen/


 Hydrocodone Bitart


  (Norco 10/325)  1 ea  Q4H  PRN


 ORAL


 For Pain  3/15/17 05:30


 3/22/17 05:29  3/20/17 00:36


 


 


 Al Hydroxide/Mg


 Hydroxide


  (Mylanta)  30 ml  TIDPRN  PRN


 ORAL


 INDIGESTION/DYSPEPSIA  3/16/17 21:30


 4/15/17 21:29  3/16/17 21:57


 


 


 Albuterol Sulfate


 2.5 mg  2.5 mg  Q4HRT


 [facility]


   3/16/17 11:00


 3/21/17 10:59  3/20/17 08:12


 


 


 Atorvastatin


 Calcium


  (Lipitor)  40 mg  DAILY


 ORAL


   3/15/17 09:00


 4/14/17 08:59  3/19/17 08:46


 


 


 Budesonide


  (Pulmicort)  0.25 mg  EVERY 12  HOURS


 [facility]


   3/15/17 21:00


 4/14/17 20:59  3/20/17 08:12


 


 


 Cefepime HCl/


 Dextrose


  (Maxipime/D5W)  55 ml @ 


 110 mls/hr  DAILY


 IVPB


   3/16/17 15:00


 3/23/17 14:59  3/19/17 10:35


 


 


 Docusate Sodium


  (Colace)  100 mg  DAILY


 ORAL


   3/15/17 09:00


 4/14/17 08:59  3/19/17 08:45


 


 


 Fluticasone


 Propionate 1 spray  1 spray  DAILY


 NASAL


   3/15/17 09:00


 4/14/17 08:59  3/19/17 10:34


 


 


 Gabapentin


  (Neurontin)  600 mg  THREE TIMES A  DAY


 ORAL


   3/15/17 09:00


 4/14/17 08:59  3/19/17 17:29


 


 


 Glimepiride


  (Amaryl)  1 mg  BEFORE  BREAKFAST


 ORAL


   3/15/17 06:30


 4/14/17 06:29  3/20/17 06:39


 


 


 Hydrochlorothiazide


  (Hydrodiuril)  25 mg  DAILY


 ORAL


   3/15/17 09:00


 4/14/17 08:59  3/19/17 08:46


 


 


 Hydromorphone HCl


  (Dilaudid)  1 mg  Q6H  PRN


 IVP


 Severe Pain (Pain Scale 7-10)  3/15/17 05:30


 3/22/17 05:29  3/18/17 00:35


 


 


 Lisinopril


  (Zestril)  5 mg  DAILY


 ORAL


   3/15/17 09:00


 4/14/17 08:59  3/19/17 08:46


 


 


 Nifedipine


  (Procardia XL)  30 mg  DAILY


 ORAL


   3/15/17 09:00


 4/14/17 08:59  3/19/17 08:46


 


 


 Ondansetron HCl


  (Zofran)  4 mg  Q6H  PRN


 IVP


 Nausea & Vomiting  3/15/17 05:30


 4/14/17 05:29   


 


 


 Pantoprazole


  (Protonix)  40 mg  DAILY


 ORAL


   3/17/17 09:00


 4/16/17 08:59  3/19/17 08:44


 


 


 Potassium


 Chloride/Sodium


 Chloride


  (KCl/0.45% NS


 1000ml)  1,015 ml @ 


 65 mls/hr  [id number]


 IV


   3/15/17 08:00


 4/14/17 07:59  3/19/17 20:09


 


 


 Zolpidem Tartrate


  (Ambien)  5 mg  HSPRN  PRN


 ORAL


 Insomnia  3/16/17 21:15


 4/15/17 21:14  3/20/17 00:36


 

















ETHEL FERNANDEZ Mar 20, 2017 08:22

## 2017-03-20 NOTE — NEPHROLOGY PROGRESS NOTE
Assessment/Plan


Plan


L RenaL Colic -s/p Cysto + stenting By Dr Haro. Creatinine going bill. Today 

1.0    Still passing stones.


Symptoms Resolving with IVF + Pain Control





Subjective


Subjective


Passing stones.


Epigastric dyscomfort.





Objective


Objective





Last 24 Hour Vital Signs








  Date Time  Temp Pulse Resp B/P Pulse Ox O2 Delivery O2 Flow Rate FiO2


 


3/20/17 11:59  79 16  100 Nasal Cannula 3.0 32


 


3/20/17 11:50  77 16  98 Nasal Cannula 3.0 32


 


3/20/17 11:49 97.8 77 20 144/69 96 Nasal Cannula 2.0 


 


3/20/17 10:02 97.5       


 


3/20/17 10:02 97.5       


 


3/20/17 09:03    124/55    


 


3/20/17 09:01  85  124/55    


 


3/20/17 08:15  85 16  100 Nasal Cannula 2.0 28


 


3/20/17 08:06 97.5 66 19 124/55 96 Nasal Cannula 2.0 


 


3/20/17 08:05  80 16  100 Nasal Cannula 2.0 28


 


3/20/17 08:05        28


 


3/20/17 07:56      Nasal Cannula 3.0 32


 


3/20/17 07:54  80 16  100 Nasal Cannula 3.0 32


 


3/20/17 07:45  80 16  98 Nasal Cannula 3.0 32


 


3/20/17 07:44     98 Nasal Cannula 3.0 32


 


3/20/17 04:00 97.7 84 18 134/71 95 Nasal Cannula 2.0 


 


3/20/17 03:59  77 14  99 Nasal Cannula 3.0 32


 


3/20/17 03:50  76 14  96 Nasal Cannula 3.0 32


 


3/20/17 00:00 97.0 76 18 129/66 96 Nasal Cannula 2.0 


 


3/19/17 23:37  75 14  99 Nasal Cannula 3.0 32


 


3/19/17 23:30  75 14  100 Nasal Cannula 3.0 32


 


3/19/17 21:26  72 16  100 Nasal Cannula 2.0 28


 


3/19/17 21:17        28


 


3/19/17 21:17  88 16  98 Nasal Cannula 2.0 28


 


3/19/17 20:00 97.9 86 19 133/52 92 Nasal Cannula 2.0 


 


3/19/17 19:21  79 14  99 Nasal Cannula 3.0 32


 


3/19/17 19:13      Nasal Cannula 3.0 32


 


3/19/17 19:12     94 Nasal Cannula 3.0 32


 


3/19/17 19:12  75 14  94 Nasal Cannula 3.0 32


 


3/19/17 19:00  75 16   Nasal Cannula 2.0 28


 


3/19/17 16:23 97.7 85 16 136/56 98 Room Air  


 


3/19/17 15:04  66 18  98 Nasal Cannula 3.0 32


 


3/19/17 14:54  68 18  91 Nasal Cannula 3.0 32

















Intake and Output  


 


 3/19/17 3/20/17





 19:00 07:00


 


Intake Total 1080 ml 1100 ml


 


Balance 1080 ml 1100 ml


 


  


 


Intake Oral 300 ml 450 ml


 


IV Total 780 ml 650 ml


 


# Voids 7 3








Laboratory Tests


3/20/17 04:55: 


White Blood Count 5.4, Red Blood Count 4.43, Hemoglobin 12.2, Hematocrit 37.5, 

Mean Corpuscular Volume 85, Mean Corpuscular Hemoglobin 27.6, Mean Corpuscular 

Hemoglobin Concent 32.6, Red Cell Distribution Width 13.1, Platelet Count 169, 

Mean Platelet Volume 8.3, Neutrophils (%) (Auto) 47.8, Lymphocytes (%) (Auto) 

36.8, Monocytes (%) (Auto) 10.1H, Eosinophils (%) (Auto) 3.8H, Basophils (%) (

Auto) 1.6, Sodium Level 139, Potassium Level 4.2, Chloride Level 98, Carbon 

Dioxide Level 26, Anion Gap 15, Blood Urea Nitrogen 17, Creatinine 1.0H, 

Estimat Glomerular Filtration Rate > 60, Glucose Level 122H, Calcium Level 9.0


Height (Feet):  5


Height (Inches):  5.00


Weight (Pounds):  220


Objective


Cv RR


Lungs CTA


Abd SNT. BS+


E No YOLIEE











SINDI JIMENEZ Mar 20, 2017 13:50

## 2017-03-21 VITALS — DIASTOLIC BLOOD PRESSURE: 57 MMHG | SYSTOLIC BLOOD PRESSURE: 130 MMHG

## 2017-03-21 VITALS — DIASTOLIC BLOOD PRESSURE: 94 MMHG | SYSTOLIC BLOOD PRESSURE: 130 MMHG

## 2017-03-21 VITALS — DIASTOLIC BLOOD PRESSURE: 89 MMHG | SYSTOLIC BLOOD PRESSURE: 152 MMHG

## 2017-03-21 VITALS — SYSTOLIC BLOOD PRESSURE: 124 MMHG | DIASTOLIC BLOOD PRESSURE: 64 MMHG

## 2017-03-21 VITALS — SYSTOLIC BLOOD PRESSURE: 140 MMHG | DIASTOLIC BLOOD PRESSURE: 74 MMHG

## 2017-03-21 RX ADMIN — FLUTICASONE PROPIONATE SCH SPRAY: 50 SPRAY, METERED NASAL at 09:07

## 2017-03-21 RX ADMIN — BUDESONIDE SCH MG: 0.25 SUSPENSION RESPIRATORY (INHALATION) at 07:24

## 2017-03-21 RX ADMIN — SODIUM CHLORIDE SCH MLS/HR: 900 INJECTION, SOLUTION INTRAVENOUS at 03:21

## 2017-03-21 RX ADMIN — DOCUSATE SODIUM SCH MG: 100 TABLET ORAL at 08:31

## 2017-03-21 RX ADMIN — LISINOPRIL SCH MG: 2.5 TABLET ORAL at 08:31

## 2017-03-21 RX ADMIN — ALBUTEROL SULFATE SCH MG: 2.5 SOLUTION RESPIRATORY (INHALATION) at 07:24

## 2017-03-21 RX ADMIN — SODIUM CHLORIDE SCH MLS/HR: 0.9 INJECTION INTRAVENOUS at 08:32

## 2017-03-21 RX ADMIN — HYDROCODONE BITARTRATE AND ACETAMINOPHEN PRN EA: 10; 325 TABLET ORAL at 01:54

## 2017-03-21 RX ADMIN — HYDROCODONE BITARTRATE AND ACETAMINOPHEN PRN EA: 10; 325 TABLET ORAL at 16:00

## 2017-03-21 RX ADMIN — ALBUTEROL SULFATE SCH MG: 2.5 SOLUTION RESPIRATORY (INHALATION) at 03:41

## 2017-03-21 RX ADMIN — GLIMEPIRIDE SCH MG: 1 TABLET ORAL at 06:02

## 2017-03-21 NOTE — PULMONOLOGY PROGRESS NOTE
Assessment/Plan


Assessment/Plan





IMPRESSION:


1. Renal stone with associated hydronephrosis, associated flank pain.


2. Asthma, clinically stable.


3. Mild chest tightness and cough.


4. Chronic rhinitis.


5. Hypercholesterolemia.








PLAN


no change in exam for now


care noted


same RX per pulmonary for now; no changes needed


follow up clinically  for changes


no change for now 


monitor for change


urology clearance pending


additional therapy if needed


all care reviewed


patient updated as to status





impression, plan, and exam edited and reviewed in detail


care discussed with RN





Subjective


Allergies:  


Coded Allergies:  


     DIAZEPAM (Verified  Allergy, Mild, Itching, 12/13/15)


     DOBUTAMINE (Verified  Allergy, Unknown, 12/13/15)


Subjective


comfortable 


doing well


no distress





Objective





Last 24 Hour Vital Signs








  Date Time  Temp Pulse Resp B/P Pulse Ox O2 Delivery O2 Flow Rate FiO2


 


3/21/17 11:27  72 16  100 Room Air  


 


3/21/17 11:27 97.6 75 19 124/64 96 Room Air  


 


3/21/17 11:15  70 16  97 Room Air  


 


3/21/17 09:30 97.2       


 


3/21/17 08:32  71  152/89    


 


3/21/17 08:31    152/89    


 


3/21/17 07:59 97.2 71 19 152/89 96 Room Air  


 


3/21/17 07:38  75 16  100 Room Air  21


 


3/21/17 07:29      Room Air  


 


3/21/17 07:26  74 16   Room Air  


 


3/21/17 07:13  74 16  100 Room Air  


 


3/21/17 07:06  72 16  97 Room Air  


 


3/21/17 07:05     97 Room Air  


 


3/21/17 04:00 97.5 67 20 140/74 94 Room Air  


 


3/21/17 03:48  69 16  100 Room Air  


 


3/21/17 03:41  66 16  97 Room Air  


 


3/21/17 00:00 97.0 74 20 130/57 99 Room Air  


 


3/20/17 22:36  75 16  99 Room Air  


 


3/20/17 22:25  75 16  96 Room Air  


 


3/20/17 21:50  92 16  100 Nasal Cannula 2.0 28


 


3/20/17 20:29  81 16  99 Room Air  


 


3/20/17 20:15  78 16  94 Room Air  


 


3/20/17 20:14  78 16  95 Room Air  


 


3/20/17 20:14      Room Air  


 


3/20/17 20:14     94 Room Air  


 


3/20/17 19:00 97.3 76 20 137/72 92 Room Air  


 


3/20/17 18:03 97.5       


 


3/20/17 16:00 97.5 77 20 136/65 93 Room Air  


 


3/20/17 15:38  79 16   Nasal Cannula 3.0 32


 


3/20/17 15:28  79 16   Nasal Cannula 3.0 32

















Intake and Output  


 


 3/20/17 3/21/17





 19:00 07:00


 


Intake Total 1335 ml 1075 ml


 


Balance 1335 ml 1075 ml


 


  


 


Intake Oral 620 ml 240 ml


 


IV Total 715 ml 835 ml


 


# Voids 5 6


 


# Bowel Movements 1 








Objective


HEENT:  Overall negative, otherwise oropharynx is clear.  No thrush.


NECK:  No jugular venous distention.


LUNGS:  Good air entry.  minimal wheezes and rhonchi noted


CARDIAC:  Normal S1 and S2.  Regular, rate, and rhythm.  Soft


systolic murmur, left parasternal border.  No rubs or gallops.


ABDOMEN:  Soft and nontender.  No flank tenderness.


EXTREMITIES:  No cyanosis or clubbing.  There is trace edema.


NEUROLOGIC:  Grossly nonfocal.





Current Medications








 Medications


  (Trade)  Dose


 Ordered  Sig/Ashwini


 Route


 PRN Reason  Start Time


 Stop Time Status Last Admin


Dose Admin


 


 Acetaminophen


  (Tylenol)  500 mg  Q6H  PRN


 ORAL


 Mild Pain/Temp > 100.5  3/15/17 05:30


 4/14/17 05:29   


 


 


 Acetaminophen/


 Hydrocodone Bitart


  (Norco 10/325)  1 ea  Q4H  PRN


 ORAL


 For Pain  3/15/17 05:30


 3/22/17 05:29  3/21/17 01:54


 


 


 Al Hydroxide/Mg


 Hydroxide


  (Mylanta)  30 ml  TIDPRN  PRN


 ORAL


 INDIGESTION/DYSPEPSIA  3/16/17 21:30


 4/15/17 21:29  3/20/17 17:04


 


 


 Atorvastatin


 Calcium


  (Lipitor)  40 mg  DAILY


 ORAL


   3/15/17 09:00


 4/14/17 08:59  3/21/17 08:30


 


 


 Budesonide


  (Pulmicort)  0.25 mg  EVERY 12  HOURS


 HHN


   3/15/17 21:00


 4/14/17 20:59  3/21/17 07:24


 


 


 Cefepime HCl/


 Dextrose


  (Maxipime/D5W)  55 ml @ 


 110 mls/hr  Q12HR


 IVPB


   3/20/17 21:00


 3/23/17 20:59  3/21/17 08:32


 


 


 Docusate Sodium


  (Colace)  100 mg  DAILY


 ORAL


   3/15/17 09:00


 4/14/17 08:59  3/21/17 08:31


 


 


 Fluticasone


 Propionate 1 spray  1 spray  DAILY


 NASAL


   3/15/17 09:00


 4/14/17 08:59  3/21/17 09:07


 


 


 Gabapentin


  (Neurontin)  600 mg  THREE TIMES A  DAY


 ORAL


   3/15/17 09:00


 4/14/17 08:59  3/21/17 08:31


 


 


 Glimepiride


  (Amaryl)  1 mg  BEFORE  BREAKFAST


 ORAL


   3/15/17 06:30


 4/14/17 06:29  3/21/17 06:02


 


 


 Hydrochlorothiazide


  (Hydrodiuril)  25 mg  DAILY


 ORAL


   3/15/17 09:00


 4/14/17 08:59  3/21/17 08:31


 


 


 Hydromorphone HCl


  (Dilaudid)  1 mg  Q6H  PRN


 IVP


 Severe Pain (Pain Scale 7-10)  3/15/17 05:30


 3/22/17 05:29  3/18/17 00:35


 


 


 Lisinopril


  (Zestril)  5 mg  DAILY


 ORAL


   3/15/17 09:00


 4/14/17 08:59  3/21/17 08:31


 


 


 Nifedipine


  (Procardia XL)  30 mg  DAILY


 ORAL


   3/15/17 09:00


 4/14/17 08:59  3/21/17 08:32


 


 


 Ondansetron HCl


  (Zofran)  4 mg  Q6H  PRN


 IVP


 Nausea & Vomiting  3/15/17 05:30


 4/14/17 05:29   


 


 


 Pantoprazole


  (Protonix)  40 mg  DAILY


 ORAL


   3/17/17 09:00


 4/16/17 08:59  3/21/17 08:32


 


 


 Polyethylene


 Glycol


  (Miralax)  17 gm  DAILYPRN  PRN


 ORAL


 Constipation  3/20/17 14:00


 4/19/17 13:59  3/20/17 22:08


 


 


 Potassium


 Chloride/Sodium


 Chloride


  (KCl/0.45% NS


 1000ml)  1,015 ml @ 


 65 mls/hr  [id number]


 IV


   3/15/17 08:00


 4/14/17 07:59  3/21/17 03:21


 


 


 Zolpidem Tartrate


 5 mg  5 mg  HSPRN  PRN


 ORAL


 Insomnia  3/16/17 21:15


 4/15/17 21:14  3/20/17 22:08


 

















ETHEL FERNANDEZ Mar 21, 2017 12:02

## 2017-03-21 NOTE — UROLOGY PROGRESS NOTE
Assessment/Plan


Assessment/Plan


1. Nephrolithiasis with colic.


2. Left-sided hydronephrosis.


3. Mild acute kidney injury, better.


4. Hematuria.


5. History of bladder tumor.


6. POD # 4, ureteroscopy/litho/stent





monitor clinically


abx as ordered


outpt f/u to check KUB


stent removal once all stone fragments cleared





Subjective


Allergies:  


Coded Allergies:  


     DIAZEPAM (Verified  Allergy, Mild, Itching, 12/13/15)


     DOBUTAMINE (Verified  Allergy, Unknown, 12/13/15)


Subjective


all noted, feels fair





Objective





Last 24 Hour Vital Signs








  Date Time  Temp Pulse Resp B/P Pulse Ox O2 Delivery O2 Flow Rate FiO2


 


3/21/17 09:30 97.2       


 


3/21/17 08:32  71  152/89    


 


3/21/17 08:31    152/89    


 


3/21/17 07:59 97.2 71 19 152/89 96 Room Air  


 


3/21/17 07:38  75 16  100 Room Air  21


 


3/21/17 07:29      Room Air  


 


3/21/17 07:26  74 16   Room Air  


 


3/21/17 07:13  74 16  100 Room Air  


 


3/21/17 07:06  72 16  97 Room Air  


 


3/21/17 07:05     97 Room Air  


 


3/21/17 04:00 97.5 67 20 140/74 94 Room Air  


 


3/21/17 03:48  69 16  100 Room Air  


 


3/21/17 03:41  66 16  97 Room Air  


 


3/21/17 00:00 97.0 74 20 130/57 99 Room Air  


 


3/20/17 22:36  75 16  99 Room Air  


 


3/20/17 22:25  75 16  96 Room Air  


 


3/20/17 21:50  92 16  100 Nasal Cannula 2.0 28


 


3/20/17 20:29  81 16  99 Room Air  


 


3/20/17 20:15  78 16  94 Room Air  


 


3/20/17 20:14  78 16  95 Room Air  


 


3/20/17 20:14      Room Air  


 


3/20/17 20:14     94 Room Air  


 


3/20/17 19:00 97.3 76 20 137/72 92 Room Air  


 


3/20/17 18:03 97.5       


 


3/20/17 16:00 97.5 77 20 136/65 93 Room Air  


 


3/20/17 15:38  79 16   Nasal Cannula 3.0 32


 


3/20/17 15:28  79 16   Nasal Cannula 3.0 32


 


3/20/17 11:59  79 16  100 Nasal Cannula 3.0 32


 


3/20/17 11:50  77 16  98 Nasal Cannula 3.0 32


 


3/20/17 11:49 97.8 77 20 144/69 96 Nasal Cannula 2.0 

















Intake and Output  


 


 3/20/17 3/21/17





 19:00 07:00


 


Intake Total 1335 ml 1075 ml


 


Balance 1335 ml 1075 ml


 


  


 


Intake Oral 620 ml 240 ml


 


IV Total 715 ml 835 ml


 


# Voids 5 6


 


# Bowel Movements 1 











Microbiology








 Date/Time


Source Procedure


Growth Status


 


 


 3/15/17 21:00


Urine,Clean Catch Urine Culture - Final


Escherichia Coli Complete








Current Medications








 Medications


  (Trade)  Dose


 Ordered  Sig/Ashwini


 Route


 PRN Reason  Start Time


 Stop Time Status Last Admin


Dose Admin


 


 Acetaminophen


  (Tylenol)  500 mg  Q6H  PRN


 ORAL


 Mild Pain/Temp > 100.5  3/15/17 05:30


 4/14/17 05:29   


 


 


 Acetaminophen/


 Hydrocodone Bitart


  (Norco 10/325)  1 ea  Q4H  PRN


 ORAL


 For Pain  3/15/17 05:30


 3/22/17 05:29  3/21/17 01:54


 


 


 Al Hydroxide/Mg


 Hydroxide


  (Mylanta)  30 ml  TIDPRN  PRN


 ORAL


 INDIGESTION/DYSPEPSIA  3/16/17 21:30


 4/15/17 21:29  3/20/17 17:04


 


 


 Albuterol Sulfate


  (Proventil)  2.5 mg  Q4HRT


 HHN


   3/16/17 11:00


 3/21/17 10:59  3/21/17 07:24


 


 


 Atorvastatin


 Calcium


  (Lipitor)  40 mg  DAILY


 ORAL


   3/15/17 09:00


 4/14/17 08:59  3/21/17 08:30


 


 


 Budesonide


  (Pulmicort)  0.25 mg  EVERY 12  HOURS


 HHN


   3/15/17 21:00


 4/14/17 20:59  3/21/17 07:24


 


 


 Cefepime HCl/


 Dextrose


  (Maxipime/D5W)  55 ml @ 


 110 mls/hr  Q12HR


 IVPB


   3/20/17 21:00


 3/23/17 20:59  3/21/17 08:32


 


 


 Docusate Sodium


  (Colace)  100 mg  DAILY


 ORAL


   3/15/17 09:00


 4/14/17 08:59  3/21/17 08:31


 


 


 Fluticasone


 Propionate 1 spray  1 spray  DAILY


 NASAL


   3/15/17 09:00


 4/14/17 08:59  3/21/17 09:07


 


 


 Gabapentin


  (Neurontin)  600 mg  THREE TIMES A  DAY


 ORAL


   3/15/17 09:00


 4/14/17 08:59  3/21/17 08:31


 


 


 Glimepiride


  (Amaryl)  1 mg  BEFORE  BREAKFAST


 ORAL


   3/15/17 06:30


 4/14/17 06:29  3/21/17 06:02


 


 


 Hydrochlorothiazide


  (Hydrodiuril)  25 mg  DAILY


 ORAL


   3/15/17 09:00


 4/14/17 08:59  3/21/17 08:31


 


 


 Hydromorphone HCl


  (Dilaudid)  1 mg  Q6H  PRN


 IVP


 Severe Pain (Pain Scale 7-10)  3/15/17 05:30


 3/22/17 05:29  3/18/17 00:35


 


 


 Lisinopril


  (Zestril)  5 mg  DAILY


 ORAL


   3/15/17 09:00


 4/14/17 08:59  3/21/17 08:31


 


 


 Nifedipine


  (Procardia XL)  30 mg  DAILY


 ORAL


   3/15/17 09:00


 4/14/17 08:59  3/21/17 08:32


 


 


 Ondansetron HCl


  (Zofran)  4 mg  Q6H  PRN


 IVP


 Nausea & Vomiting  3/15/17 05:30


 4/14/17 05:29   


 


 


 Pantoprazole


  (Protonix)  40 mg  DAILY


 ORAL


   3/17/17 09:00


 4/16/17 08:59  3/21/17 08:32


 


 


 Polyethylene


 Glycol


  (Miralax)  17 gm  DAILYPRN  PRN


 ORAL


 Constipation  3/20/17 14:00


 4/19/17 13:59  3/20/17 22:08


 


 


 Potassium


 Chloride/Sodium


 Chloride


  (KCl/0.45% NS


 1000ml)  1,015 ml @ 


 65 mls/hr  [id number]


 IV


   3/15/17 08:00


 4/14/17 07:59  3/21/17 03:21


 


 


 Zolpidem Tartrate


 5 mg  5 mg  HSPRN  PRN


 ORAL


 Insomnia  3/16/17 21:15


 4/15/17 21:14  3/20/17 22:08


 








Height (Feet):  5


Height (Inches):  5.00


Weight (Pounds):  220


Objective


 exam stable











FAN RICHMOND Mar 21, 2017 10:26

## 2017-03-21 NOTE — NEPHROLOGY PROGRESS NOTE
Assessment/Plan


Plan


L RenaL Colic -s/p Cysto + stenting By Dr Haro. Creatinine going bill. Today 

1.0    


OK for DC home.





Subjective


Subjective


Not passing stones anymore.


Less epigastric dyscomfort.





Objective


Objective





Last 24 Hour Vital Signs








  Date Time  Temp Pulse Resp B/P Pulse Ox O2 Delivery O2 Flow Rate FiO2


 


3/21/17 11:27  72 16  100 Room Air  


 


3/21/17 11:27 97.6 75 19 124/64 96 Room Air  


 


3/21/17 11:15  70 16  97 Room Air  


 


3/21/17 09:30 97.2       


 


3/21/17 08:32  71  152/89    


 


3/21/17 08:31    152/89    


 


3/21/17 07:59 97.2 71 19 152/89 96 Room Air  


 


3/21/17 07:38  75 16  100 Room Air  21


 


3/21/17 07:29      Room Air  


 


3/21/17 07:26  74 16   Room Air  


 


3/21/17 07:13  74 16  100 Room Air  


 


3/21/17 07:06  72 16  97 Room Air  


 


3/21/17 07:05     97 Room Air  


 


3/21/17 04:00 97.5 67 20 140/74 94 Room Air  


 


3/21/17 03:48  69 16  100 Room Air  


 


3/21/17 03:41  66 16  97 Room Air  


 


3/21/17 00:00 97.0 74 20 130/57 99 Room Air  


 


3/20/17 22:36  75 16  99 Room Air  


 


3/20/17 22:25  75 16  96 Room Air  


 


3/20/17 21:50  92 16  100 Nasal Cannula 2.0 28


 


3/20/17 20:29  81 16  99 Room Air  


 


3/20/17 20:15  78 16  94 Room Air  


 


3/20/17 20:14  78 16  95 Room Air  


 


3/20/17 20:14      Room Air  


 


3/20/17 20:14     94 Room Air  


 


3/20/17 19:00 97.3 76 20 137/72 92 Room Air  


 


3/20/17 18:03 97.5       


 


3/20/17 16:00 97.5 77 20 136/65 93 Room Air  


 


3/20/17 15:38  79 16   Nasal Cannula 3.0 32


 


3/20/17 15:28  79 16   Nasal Cannula 3.0 32

















Intake and Output  


 


 3/20/17 3/21/17





 19:00 07:00


 


Intake Total 1335 ml 1075 ml


 


Balance 1335 ml 1075 ml


 


  


 


Intake Oral 620 ml 240 ml


 


IV Total 715 ml 835 ml


 


# Voids 5 6


 


# Bowel Movements 1 








Height (Feet):  5


Height (Inches):  5.00


Weight (Pounds):  220


Objective


Cv RR


Lungs CTA


Abd SNT. BS+


E No CCE











SINDI JIMENEZ Mar 21, 2017 12:17

## 2017-03-21 NOTE — CARDIOLOGY REPORT
--------------- APPROVED REPORT --------------





EKG Measurement

Heart [id number]

CA [id number]

[id number]

[id number]

[id number]





Normal sinus rhythm

Left axis deviation

Abnormal ECG

## 2017-03-23 NOTE — DISCHARGE SUMMARY
Discharge Summary


Hospital Course


Date of Admission


Mar 15, 2017 at 00:44


Date of Discharge


Mar 21, 2017 at 19:11


Admitting Diagnosis


ARF, kidney stone


HPI


Alex Soliman is a 66 year old female who was admitted on Mar 15, 2017 at 00:

43 for Acute Renal Failure,Kidney Stone


Hospital Course


[id number]





Discharge


Discharge Disposition


Patient was discharged to Home (01)


Discharge Diagnoses:  











Mayte Hector NP Mar 23, 2017 11:21

## 2017-03-24 NOTE — DISCHARGE SUMMARY 2 SIG
DATE OF ADMISSION:  03/15/2017



DATE OF DISCHARGE:  03/21/2017



CONSULTANTS:

1. Gavino Haro M.D.

2. Justus Treadwell M.D.



BRIEF HOSPITAL COURSE:  The patient is a 66-year-old 

female, who presented to ED complaining of severe pain.  CAT scan of the

abdomen and pelvis without contrast showed a 9 mm left ureteropelvic

junction calculus with mild to moderate hydronephrosis and swelling of the

left kidney, bilateral intrarenal calculi, largely on the left than on

right and 9 mm left lung nodule.  She was admitted to Medical/Surgical.

Dr. Haro was consulted.  The stone is large and would not pass

naturally.  She was given IV hydration, pain management, and underwent

cystoscopy, left ureteroscopy, laser lithotripsy, shockwave lithotripsies,

stone extraction, retrograde pyelogram, and placement of double-J stent on

03/17/2017 by Dr. Haro.  Dr. Treadwell was also consulted.  The patient

has a history of PET positive lung nodule and has undergone lung biopsy in

2016.  She also has asthma and was given Pulmicort twice a day, albuterol

inhaler, and QVAR at bedtime.  Post cystoscopy, she was given a cefepime

and has been passing stones.  Creatinine trended down.  She was eventually

discharged home.  Advised stent removal once all fragments have been

cleared.



FINAL DIAGNOSES:

1. Left renal colic with nephrolithiasis.

2. Left-sided hydronephrosis.

3. Status post cystoscopy, ureteroscopy, lithotripsy, and stent placement.

4. Acute kidney injury.

5. Hematuria.

6. History of bladder tumour.

7. Asthma.

8. Chronic rhinitis.

9. Hypercholesterolemia.









  ______________________________________________

  Gale Velasquez M.D.



I have been assigned to dictate discharge summary on this account and I

was not involved in the patient's management.



  ______________________________________________

  Mayte Hector N.P.





DR:  WESLEY

D:  03/23/2017 11:23

T:  03/24/2017 02:17

JOB#:  [id number]

CC:



ATUL

## 2017-03-27 ENCOUNTER — HOSPITAL ENCOUNTER (EMERGENCY)
Dept: HOSPITAL 72 - EMR | Age: 67
Discharge: HOME | End: 2017-03-27
Payer: MEDICARE

## 2017-03-27 VITALS — SYSTOLIC BLOOD PRESSURE: 135 MMHG | DIASTOLIC BLOOD PRESSURE: 75 MMHG

## 2017-03-27 VITALS — BODY MASS INDEX: 36.99 KG/M2 | WEIGHT: 222 LBS | HEIGHT: 65 IN

## 2017-03-27 DIAGNOSIS — E11.9: ICD-10-CM

## 2017-03-27 DIAGNOSIS — Z88.8: ICD-10-CM

## 2017-03-27 DIAGNOSIS — R19.7: ICD-10-CM

## 2017-03-27 DIAGNOSIS — J45.909: ICD-10-CM

## 2017-03-27 DIAGNOSIS — N39.0: Primary | ICD-10-CM

## 2017-03-27 DIAGNOSIS — I10: ICD-10-CM

## 2017-03-27 LAB
ALBUMIN/GLOB SERPL: 1 {RATIO} (ref 1–2.7)
ALT SERPL-CCNC: 24 U/L (ref 3–33)
ANION GAP SERPL CALC-SCNC: 17 MMOL/L (ref 5–15)
APPEARANCE UR: (no result)
AST SERPL-CCNC: 19 U/L (ref 5–40)
BACTERIA #/AREA URNS HPF: (no result) /HPF
BASOPHILS NFR BLD AUTO: 1.2 % (ref 0–2)
BLD.DRIED STONE QL: (no result)
CALCIUM OXALATE DIHYDRATE MFR STONE IR: (no result) %
CALCIUM SERPL-MCNC: 10.7 MG/DL (ref 8.6–10.2)
CELL MATERIAL STONE EST-MCNT: (no result) %
CHLORIDE SERPL-SCNC: 98 MEQ/L (ref 98–107)
CHOLEST SERPL-MCNC: (no result) MG/DL
CK MB SERPL-MCNC: 3.1 NG/ML (ref ?–3.8)
CO2 SERPL-SCNC: 26 MEQ/L (ref 20–30)
CREAT SERPL-MCNC: 1 MG/DL (ref 0.5–0.9)
CYSTINE UR-SCNC: (no result) UMOL/L
EOSINOPHIL NFR BLD AUTO: 1.9 % (ref 0–3)
ERYTHROCYTE [DISTWIDTH] IN BLOOD BY AUTOMATED COUNT: 13.4 % (ref 11.6–14.8)
GFR SERPLBLD BASED ON 1.73 SQ M-ARVRAT: > 60 ML/MIN (ref 60–?)
GLOBULIN SER-MCNC: 4.2 G/DL
HEMOLYSIS: 1
KETONES UR QL STRIP: NEGATIVE
LEUKOCYTE ESTERASE UR QL STRIP: (no result)
LIPASE SERPL-CCNC: 57 U/L (ref ?–60)
LYMPHOCYTES NFR BLD AUTO: 40 % (ref 20–45)
Lab: (no result)
Lab: (no result)
Lab: 85 %
MCH RBC QN AUTO: 28 PG (ref 27–31)
MCHC RBC AUTO-ENTMCNC: 32.4 G/DL (ref 32–36)
MCV RBC AUTO: 86 FL (ref 80–99)
MONOCYTES NFR BLD AUTO: 5.4 % (ref 1–10)
NA URATE CRY STONE QL IR: (no result)
NEUTROPHILS NFR BLD AUTO: 51.4 % (ref 45–75)
NITRITE UR QL STRIP: NEGATIVE
PH UR STRIP: 5 [PH] (ref 4.5–8)
PLATELET # BLD: 321 K/UL (ref 150–450)
PMV BLD AUTO: 9 FL (ref 6.5–10.1)
POTASSIUM SERPL-SCNC: 3.5 MEQ/L (ref 3.4–4.9)
PROT SERPL-MCNC: 8.7 G/DL (ref 6.6–8.7)
PROT UR QL STRIP: (no result)
RBC # BLD AUTO: 4.75 M/UL (ref 4.2–5.4)
RBC #/AREA URNS HPF: (no result) /HPF (ref 0–2)
SODIUM SERPL-SCNC: 141 MEQ/L (ref 135–145)
SP GR UR STRIP: 1.01 (ref 1–1.03)
SQUAMOUS #/AREA URNS LPF: (no result) /LPF
STONE ANALYSIS-IMP: (no result)
URATE DIHYD CRY STONE QL IR: (no result)
URATE SERPL-MCNC: 10 %
UROBILINOGEN UR-MCNC: NORMAL MG/DL (ref 0–1)
WBC # BLD AUTO: 6.8 K/UL (ref 4.8–10.8)
WBC #/AREA URNS HPF: (no result) /HPF (ref 0–2)
WT STONE: <10 MG

## 2017-03-27 PROCEDURE — 36415 COLL VENOUS BLD VENIPUNCTURE: CPT

## 2017-03-27 PROCEDURE — 96375 TX/PRO/DX INJ NEW DRUG ADDON: CPT

## 2017-03-27 PROCEDURE — 82553 CREATINE MB FRACTION: CPT

## 2017-03-27 PROCEDURE — 81003 URINALYSIS AUTO W/O SCOPE: CPT

## 2017-03-27 PROCEDURE — 87181 SC STD AGAR DILUTION PER AGT: CPT

## 2017-03-27 PROCEDURE — 93005 ELECTROCARDIOGRAM TRACING: CPT

## 2017-03-27 PROCEDURE — 96374 THER/PROPH/DIAG INJ IV PUSH: CPT

## 2017-03-27 PROCEDURE — 83605 ASSAY OF LACTIC ACID: CPT

## 2017-03-27 PROCEDURE — 87040 BLOOD CULTURE FOR BACTERIA: CPT

## 2017-03-27 PROCEDURE — 87086 URINE CULTURE/COLONY COUNT: CPT

## 2017-03-27 PROCEDURE — 83690 ASSAY OF LIPASE: CPT

## 2017-03-27 PROCEDURE — 80053 COMPREHEN METABOLIC PANEL: CPT

## 2017-03-27 PROCEDURE — 99284 EMERGENCY DEPT VISIT MOD MDM: CPT

## 2017-03-27 PROCEDURE — 82550 ASSAY OF CK (CPK): CPT

## 2017-03-27 PROCEDURE — 85025 COMPLETE CBC W/AUTO DIFF WBC: CPT

## 2017-03-27 NOTE — EMERGENCY ROOM REPORT
History of Present Illness


General


Chief Complaint:  Abdominal Pain


Source:  Patient





Present Illness


HPI


Patient had a left-sided ureteral stent placed approximately 8 days ago


Presents now with increased diarrhea and chills home health nurse had seen the 

patient at her home and given the complaints was sent to the ER


Patient denies any obvious fevers denies any chest pain or shortness of breath


She thought that she some fullness in the suprapubic area but denies any flank 

pain





Denies any blood in the diarrhea


Denies any recent travel


Allergies:  


Coded Allergies:  


     DIAZEPAM (Verified  Allergy, Mild, Itching, 12/13/15)


     DOBUTAMINE (Verified  Allergy, Unknown, 12/13/15)





Patient History


Past Medical History:  see triage record


Pertinent Family History:  none


Reviewed Nursing Documentation:  PMH: Agreed, PSxH: Agreed





Nursing Documentation-PMH


Hx Cardiac Problems:  No


Hx Hypertension:  Yes


Hx Asthma:  Yes


Hx Diabetes:  Yes


Hx Cancer:  No


Hx Gastrointestinal Problems:  Yes


Hx Neurological Problems:  No





Review of Systems


All Other Systems:  negative except mentioned in HPI





Physical Exam





Vital Signs








  Date Time  Temp Pulse Resp B/P Pulse Ox O2 Delivery O2 Flow Rate FiO2


 


3/27/17 16:33 97.9 74 20 142/74 100 Room Air  








Sp02 EP Interpretation:  reviewed, normal


General Appearance:  well appearing, no apparent distress


Head:  normocephalic, atraumatic


Eyes:  bilateral eye EOMI, bilateral eye PERRL


ENT:  hearing grossly normal, normal pharynx, TMs + canals normal, uvula midline


Neck:  full range of motion, supple, no meningismus, no bony tend


Respiratory:  lungs clear, normal breath sounds, no rhonchi, no respiratory 

distress, no retraction, no accessory muscle use


Cardiovascular #1:  normal peripheral pulses, regular rate, rhythm, no edema, 

no gallop, no JVD, no murmur


Gastrointestinal:  normal bowel sounds, non tender, soft, no mass, no 

organomegaly, non-distended, no guarding, no hernia, no pulsatile mass, no 

rebound


Genitourinary:  no CVA tenderness


Musculoskeletal:  normal inspection


Neurologic:  oriented x3, responsive, CNs III-XII nml as tested, motor strength/

tone normal, sensory intact


Psychiatric:  mood/affect normal


Skin:  normal color, no rash, warm/dry, palpation normal


Lymphatic:  normal inspection, no adenopathy





Medical Decision Making


Diagnostic Impression:  


 Primary Impression:  


 UTI (urinary tract infection)


 Additional Impression:  


 Diarrhea


ER Course


Multiple differentials considered including but not limited to, sepsis, C. 

difficile, UTI





Patient has done well throughout her stay





Blood work is appropriate urine sample does show evidence of few bacteria


Given the patient's recent stent placement she was given oral and IV antibiotics





I did contact the patient's urologist


Who feels also appropriate with close outpatient followup





Labs








Test


  3/27/17


16:40 3/27/17


16:45


 


Urine Color Pale yellow  


 


Urine Appearance


  Slightly


cloudy 


 


 


Urine pH 5 (4.5-8.0)  


 


Urine Specific Gravity


  1.015


(1.005-1.035) 


 


 


Urine Protein 2+ (NEGATIVE)  


 


Urine Glucose (UA)


  Negative


(NEGATIVE) 


 


 


Urine Ketones


  Negative


(NEGATIVE) 


 


 


Urine Occult Blood 2+ (NEGATIVE)  


 


Urine Nitrite


  Negative


(NEGATIVE) 


 


 


Urine Bilirubin


  Negative


(NEGATIVE) 


 


 


Urine Urobilinogen


  Normal MG/DL


(0.0-1.0) 


 


 


Urine Leukocyte Esterase 3+ (NEGATIVE)  


 


Urine RBC


  5-10 /HPF (0 -


2) 


 


 


Urine WBC


  15-20 /HPF (0


- 2) 


 


 


Urine Squamous Epithelial


Cells Few /LPF


(NONE/OCC) 


 


 


Urine Bacteria


  Moderate /HPF


(NONE) 


 


 


White Blood Count


  


  6.8 K/UL


(4.8-10.8)


 


Red Blood Count


  


  4.75 M/UL


(4.20-5.40)


 


Hemoglobin


  


  13.3 G/DL


(12.0-16.0)


 


Hematocrit


  


  41.0 %


(37.0-47.0)


 


Mean Corpuscular Volume  86 FL (80-99) 


 


Mean Corpuscular Hemoglobin


  


  28.0 PG


(27.0-31.0)


 


Mean Corpuscular Hemoglobin


Concent 


  32.4 G/DL


(32.0-36.0)


 


Red Cell Distribution Width


  


  13.4 %


(11.6-14.8)


 


Platelet Count


  


  321 K/UL


(150-450)


 


Mean Platelet Volume


  


  9.0 FL


(6.5-10.1)


 


Neutrophils (%) (Auto)


  


  51.4 %


(45.0-75.0)


 


Lymphocytes (%) (Auto)


  


  40.0 %


(20.0-45.0)


 


Monocytes (%) (Auto)


  


  5.4 %


(1.0-10.0)


 


Eosinophils (%) (Auto)


  


  1.9 %


(0.0-3.0)


 


Basophils (%) (Auto)


  


  1.2 %


(0.0-2.0)


 


Sodium Level


  


  141 mEQ/L


(135-145)


 


Potassium Level


  


  3.5 mEQ/L


(3.4-4.9)


 


Chloride Level


  


  98 mEQ/L


()


 


Carbon Dioxide Level


  


  26 mEQ/L


(20-30)


 


Anion Gap  17 (5-15) 


 


Blood Urea Nitrogen


  


  17 mg/dL


(7-23)


 


Creatinine


  


  1.0 mg/dL


(0.5-0.9)


 


Estimat Glomerular Filtration


Rate 


  > 60 mL/min


(>60)


 


Glucose Level


  


  100 mg/dL


()


 


Lactic Acid Level


  


  1.50 mmol/L


(0.66-2.22)


 


Calcium Level


  


  10.7 mg/dL


(8.6-10.2)


 


Total Bilirubin


  


  0.3 mg/dL


(0.0-1.2)


 


Aspartate Amino Transf


(AST/SGOT) 


  19 U/L (5-40) 


 


 


Alanine Aminotransferase


(ALT/SGPT) 


  24 U/L (3-33) 


 


 


Alkaline Phosphatase


  


  69 U/L


()


 


Total Creatine Kinase


  


  146 U/L


()


 


Creatine Kinase MB


  


  3.1 ng/mL (<


3.8)


 


Creatine Kinase MB Relative


Index 


  2.1 


 


 


Total Protein


  


  8.7 g/dL


(6.6-8.7)


 


Albumin


  


  4.5 g/dL


(3.5-5.2)


 


Globulin  4.2 g/dL 


 


Albumin/Globulin Ratio  1.0 (1.0-2.7) 


 


Lipase  57 U/L (< 60) 








Rhythm Strip Diag. Results


EP Interpretation:  yes


Rate:  78


Rhythm:  NSR, no PVC's, no ectopy





Last Vital Signs








  Date Time  Temp Pulse Resp B/P Pulse Ox O2 Delivery O2 Flow Rate FiO2


 


3/27/17 16:33 97.9 74 20 142/74 100 Room Air  








Status:  improved


Disposition:  HOME, SELF-CARE


Condition:  Improved


Scripts


Cephalexin* (KEFLEX*) 500 Mg Capsule


500 MG ORAL Q6H, #28 CAP 0 Refills


   Prov: KP GREENWOOD D.O.         3/27/17





Additional Instructions:  


Patient is provided with the discharge instructions notified to follow up with 

primary doctor in the next 2-3 days otherwise return to the er with any 

worsening symptoms.


Please note that this report is being documented using DRAGON technology.  This 

can lead to erroneous entry secondary to incorrect interpretation by the 

dictating instrument.











KP GREENWOOD D.O. Mar 27, 2017 16:50

## 2017-03-28 NOTE — CARDIOLOGY REPORT
--------------- APPROVED REPORT --------------





EKG Measurement

Heart [id number]

ND [id number]

[id number]

[id number]

[id number]





Normal sinus rhythm

Possible Anterior infarct, age undetermined

Abnormal ECG

## 2017-05-17 ENCOUNTER — HOSPITAL ENCOUNTER (OUTPATIENT)
Dept: HOSPITAL 72 - SUR | Age: 67
Discharge: HOME | End: 2017-05-17
Payer: MEDICARE

## 2017-05-17 VITALS — DIASTOLIC BLOOD PRESSURE: 85 MMHG | SYSTOLIC BLOOD PRESSURE: 146 MMHG

## 2017-05-17 VITALS — HEIGHT: 65 IN | BODY MASS INDEX: 34.49 KG/M2 | WEIGHT: 207 LBS

## 2017-05-17 VITALS — SYSTOLIC BLOOD PRESSURE: 140 MMHG | DIASTOLIC BLOOD PRESSURE: 88 MMHG

## 2017-05-17 VITALS — SYSTOLIC BLOOD PRESSURE: 156 MMHG | DIASTOLIC BLOOD PRESSURE: 80 MMHG

## 2017-05-17 VITALS — SYSTOLIC BLOOD PRESSURE: 142 MMHG | DIASTOLIC BLOOD PRESSURE: 64 MMHG

## 2017-05-17 VITALS — SYSTOLIC BLOOD PRESSURE: 139 MMHG | DIASTOLIC BLOOD PRESSURE: 65 MMHG

## 2017-05-17 VITALS — DIASTOLIC BLOOD PRESSURE: 66 MMHG | SYSTOLIC BLOOD PRESSURE: 144 MMHG

## 2017-05-17 VITALS — SYSTOLIC BLOOD PRESSURE: 148 MMHG | DIASTOLIC BLOOD PRESSURE: 80 MMHG

## 2017-05-17 VITALS — DIASTOLIC BLOOD PRESSURE: 68 MMHG | SYSTOLIC BLOOD PRESSURE: 143 MMHG

## 2017-05-17 VITALS — DIASTOLIC BLOOD PRESSURE: 71 MMHG | SYSTOLIC BLOOD PRESSURE: 144 MMHG

## 2017-05-17 VITALS — SYSTOLIC BLOOD PRESSURE: 155 MMHG | DIASTOLIC BLOOD PRESSURE: 79 MMHG

## 2017-05-17 VITALS — DIASTOLIC BLOOD PRESSURE: 68 MMHG | SYSTOLIC BLOOD PRESSURE: 132 MMHG

## 2017-05-17 DIAGNOSIS — N21.0: ICD-10-CM

## 2017-05-17 DIAGNOSIS — F41.9: ICD-10-CM

## 2017-05-17 DIAGNOSIS — Z96.641: ICD-10-CM

## 2017-05-17 DIAGNOSIS — F32.9: ICD-10-CM

## 2017-05-17 DIAGNOSIS — Z46.6: Primary | ICD-10-CM

## 2017-05-17 DIAGNOSIS — J44.9: ICD-10-CM

## 2017-05-17 DIAGNOSIS — I25.10: ICD-10-CM

## 2017-05-17 DIAGNOSIS — E78.5: ICD-10-CM

## 2017-05-17 DIAGNOSIS — Z88.8: ICD-10-CM

## 2017-05-17 DIAGNOSIS — M19.90: ICD-10-CM

## 2017-05-17 DIAGNOSIS — E03.9: ICD-10-CM

## 2017-05-17 DIAGNOSIS — Z98.61: ICD-10-CM

## 2017-05-17 DIAGNOSIS — I10: ICD-10-CM

## 2017-05-17 DIAGNOSIS — K21.9: ICD-10-CM

## 2017-05-17 DIAGNOSIS — E66.9: ICD-10-CM

## 2017-05-17 DIAGNOSIS — E11.9: ICD-10-CM

## 2017-05-17 LAB
ANION GAP SERPL CALC-SCNC: 18 MMOL/L (ref 5–15)
APTT BLD: 27 SEC (ref 23–33)
BASOPHILS NFR BLD AUTO: 0.8 % (ref 0–2)
CALCIUM SERPL-MCNC: 10.2 MG/DL (ref 8.6–10.2)
CHLORIDE SERPL-SCNC: 93 MEQ/L (ref 98–107)
CO2 SERPL-SCNC: 24 MEQ/L (ref 20–30)
CREAT SERPL-MCNC: 1.2 MG/DL (ref 0.5–0.9)
EOSINOPHIL NFR BLD AUTO: 1.2 % (ref 0–3)
ERYTHROCYTE [DISTWIDTH] IN BLOOD BY AUTOMATED COUNT: 13.3 % (ref 11.6–14.8)
GFR SERPLBLD BASED ON 1.73 SQ M-ARVRAT: 54.3 ML/MIN (ref 60–?)
HEMOLYSIS: 0
INR PPP: 1 (ref 0.9–1.1)
LYMPHOCYTES NFR BLD AUTO: 28.7 % (ref 20–45)
MCH RBC QN AUTO: 27.1 PG (ref 27–31)
MCHC RBC AUTO-ENTMCNC: 31.1 G/DL (ref 32–36)
MCV RBC AUTO: 87 FL (ref 80–99)
MONOCYTES NFR BLD AUTO: 9.1 % (ref 1–10)
NEUTROPHILS NFR BLD AUTO: 60.3 % (ref 45–75)
PLATELET # BLD: 249 K/UL (ref 150–450)
PMV BLD AUTO: 9.3 FL (ref 6.5–10.1)
POTASSIUM SERPL-SCNC: 3.4 MEQ/L (ref 3.4–4.9)
PROTHROMBIN TIME: 10.3 SEC (ref 9.3–11.5)
RBC # BLD AUTO: 4.39 M/UL (ref 4.2–5.4)
SODIUM SERPL-SCNC: 135 MEQ/L (ref 135–145)
WBC # BLD AUTO: 7.8 K/UL (ref 4.8–10.8)

## 2017-05-17 PROCEDURE — 94003 VENT MGMT INPAT SUBQ DAY: CPT

## 2017-05-17 PROCEDURE — 85025 COMPLETE CBC W/AUTO DIFF WBC: CPT

## 2017-05-17 PROCEDURE — 76000 FLUOROSCOPY <1 HR PHYS/QHP: CPT

## 2017-05-17 PROCEDURE — 82962 GLUCOSE BLOOD TEST: CPT

## 2017-05-17 PROCEDURE — 36415 COLL VENOUS BLD VENIPUNCTURE: CPT

## 2017-05-17 PROCEDURE — 74020: CPT

## 2017-05-17 PROCEDURE — 94150 VITAL CAPACITY TEST: CPT

## 2017-05-17 PROCEDURE — 80048 BASIC METABOLIC PNL TOTAL CA: CPT

## 2017-05-17 PROCEDURE — 85610 PROTHROMBIN TIME: CPT

## 2017-05-17 PROCEDURE — 85730 THROMBOPLASTIN TIME PARTIAL: CPT

## 2017-05-17 NOTE — UROLOGY PROGRESS NOTE
Subjective


Allergies:  


Coded Allergies:  


     DIAZEPAM (Verified  Allergy, Mild, Itching, 12/13/15)


     DOBUTAMINE (Verified  Allergy, Unknown, 12/13/15)





Objective





Last 24 Hour Vital Signs








  Date Time  Temp Pulse Resp B/P Pulse Ox O2 Delivery O2 Flow Rate FiO2


 


5/17/17 06:10 97.3 79 18 156/80 98 Room Air  











Current Medications








 Medications


  (Trade)  Dose


 Ordered  Sig/Ashwini


 Route


 PRN Reason  Start Time


 Stop Time Status Last Admin


Dose Admin


 


 Cefazolin Sodium/


 Sodium Chloride


  (Ancef/Sodium


 Chloride)  55 ml @ 


 110 mls/hr  ONCE  ONCE


 IVPB


   5/17/17 07:00


 5/17/17 07:29   


 





Laboratory Tests


5/17/17 05:40: 


White Blood Count 7.8, Red Blood Count 4.39, Hemoglobin 11.9L, Hematocrit 38.2, 

Mean Corpuscular Volume 87, Mean Corpuscular Hemoglobin 27.1, Mean Corpuscular 

Hemoglobin Concent 31.1L, Red Cell Distribution Width 13.3, Platelet Count 249, 

Mean Platelet Volume 9.3, Neutrophils (%) (Auto) 60.3, Lymphocytes (%) (Auto) 

28.7, Monocytes (%) (Auto) 9.1, Eosinophils (%) (Auto) 1.2, Basophils (%) (Auto

) 0.8, Prothrombin Time 10.3, Prothromb Time International Ratio 1.0, Activated 

Partial Thromboplast Time 27, Sodium Level 135, Potassium Level 3.4, Chloride 

Level 93L, Carbon Dioxide Level 24, Anion Gap 18H, Blood Urea Nitrogen 20, 

Creatinine 1.2H, Estimat Glomerular Filtration Rate 54.3, Glucose Level 106, 

Calcium Level 10.2


Height (Feet):  5


Height (Inches):  5.00


Weight (Pounds):  207











FAN RICHMOND May 17, 2017 07:11

## 2017-05-17 NOTE — ANETHESIA PREOPERATIVE EVAL
Anesthesia Pre-op PMH/ROS


General


Date of Evaluation:  May 17, 2017


Anesthesiologist:  [place]


Mallampati Score


Class I : Soft palate, uvula, fauces, pillars visible


Class II: Soft palate, uvula, fauces visible


Class III: Soft palate, base of uvula visible


Class IV: Only hard plate visible


Mallampati Classification:  Class II


Surgeon:  Tahir


Diagnosis:  Renal stone


Surgical Procedure:  Cystoscopy


Anesthesia History:  none


Family History:  no anesthesia problems


Allergies:  


Coded Allergies:  


     DIAZEPAM (Verified  Allergy, Mild, Itching, 12/13/15)


     DOBUTAMINE (Verified  Allergy, Unknown, 12/13/15)


Medications:  see eMAR





Past Medical History


Cardiovascular:  Reports: CAD - s/p PCI X3, HTN, other - HLD, 


   Denies: MI, arrhythmia, valve dz


Pulmonary:  Reports: COPD, asthma, 


   Denies: PARISH, other


Gastrointestinal/Genitourinary:  Reports: GERD, 


   Denies: CRI, ESRD, other


Neurologic/Psychiatric:  Reports: depression/anxiety, 


   Denies: CVA, TIA, dementia, other


Endocrine:  Reports: DM, hypothyroidism, 


   Denies: other, steroids


HEENT:  Denies: [place] (L), Rosebud (R), cataract (L), cataract (R), glaucoma, other


Hematology/Immune:  Denies: DVT, anemia, bleeding disorder, other


Musculoskeletal/Integumentary:  Reports: OA, 


   Denies: DDD, DJD, RA, edema, other


Other:  obesity


PSxH Narrative:


Ventral hernia repair, lap dionne, left THR, cysto, lithotripsy





Anesthesia Pre-op Phys. Exam


Physician Exam





Last Vital Signs








  Date Time  Temp Pulse Resp B/P Pulse Ox O2 Delivery O2 Flow Rate FiO2


 


5/17/17 06:10 97.3 79 18 156/80 98 Room Air  








Constitutional:  NAD


Cardiovascular:  RRR


Respiratory:  CTA





Airway Exam


Mallampati Score:  Class II


MO:  limited


ROM:  full





Anesthesia Pre-op A/P


Labs





Hematology








Test


  5/17/17


05:40


 


White Blood Count


  7.8 K/UL


(4.8-10.8)


 


Red Blood Count


  4.39 M/UL


(4.20-5.40)


 


Hemoglobin


  11.9 G/DL


(12.0-16.0)  L


 


Hematocrit


  38.2 %


(37.0-47.0)


 


Mean Corpuscular Volume 87 FL (80-99)  


 


Mean Corpuscular Hemoglobin


  27.1 PG


(27.0-31.0)


 


Mean Corpuscular Hemoglobin


Concent 31.1 G/DL


(32.0-36.0)  L


 


Red Cell Distribution Width


  13.3 %


(11.6-14.8)


 


Platelet Count


  249 K/UL


(150-450)


 


Mean Platelet Volume


  9.3 FL


(6.5-10.1)


 


Neutrophils (%) (Auto)


  60.3 %


(45.0-75.0)


 


Lymphocytes (%) (Auto)


  28.7 %


(20.0-45.0)


 


Monocytes (%) (Auto)


  9.1 %


(1.0-10.0)


 


Eosinophils (%) (Auto)


  1.2 %


(0.0-3.0)


 


Basophils (%) (Auto)


  0.8 %


(0.0-2.0)








Coagulation








Test


  5/17/17


05:40


 


Prothrombin Time


  10.3 SEC


(9.30-11.50)


 


Prothromb Time International


Ratio 1.0 (0.9-1.1)  


 


 


Activated Partial


Thromboplast Time 27 SEC (23-33)


 








Chemistry








Test


  5/17/17


05:40


 


Sodium Level


  135 mEQ/L


(135-145)


 


Potassium Level


  3.4 mEQ/L


(3.4-4.9)


 


Chloride Level


  93 mEQ/L


()  L


 


Carbon Dioxide Level


  24 mEQ/L


(20-30)


 


Anion Gap 18 (5-15)  H


 


Blood Urea Nitrogen


  20 mg/dL


(7-23)


 


Creatinine


  1.2 mg/dL


(0.5-0.9)  H


 


Estimat Glomerular Filtration


Rate 54.3 mL/min


(>60)


 


Glucose Level


  106 mg/dL


()


 


Calcium Level


  10.2 mg/dL


(8.6-10.2)











Studies


Pre-op Studies:  EKG - sr





Risk Assessment & Plan


Assessment:


ASA III


Plan:


GA


Status Change Before Surgery:  No





Pre-Antibiotics


Drug:  Ancef 2g


Given Within 1 Hr of Incision:  Yes


Time Given:  07:30











NI ALBERTO M.D. May 17, 2017 06:47

## 2017-05-17 NOTE — DIAGNOSTIC IMAGING REPORT
Indication: Abdominal pain



Comparison:  None



Fluoroscopic images of the lower abdomen/pelvis



Left ureteral stent removal noted on fluoroscopically obtained images during 

the

procedure.



Impression:



Stent removal

## 2017-05-17 NOTE — IMMEDIATE POST-OP EVALUATION
Immediate Post-Op Evalulation


Immediate Post-Op Evalulation


Procedure:  Cystoscopy, stent removal left


Date of Evaluation:  May 17, 2017


Time of Evaluation:  08:12


IV Fluids:  500


Blood Products:  0


Estimated Blood Loss:  min


Urinary Output:  0


Blood Pressure Systolic:  144


Blood Pressure Diastolic:  71


Pulse Rate:  72


Respiratory Rate:  16


O2 Sat by Pulse Oximetry:  98


Temperature (Fahrenheit):  97.3


Pain Score (1-10):  0


Nausea:  No


Vomiting:  No


Complications


0


Patient Status:  awake, reacts, patent, extubated, none


Hydration Status:  adequate


Drug:  Ancef 2g


Given Within 1 Hr of Incision:  Yes


Time Given:  07:30











NI ALBERTO M.D. May 17, 2017 08:12

## 2017-05-17 NOTE — BRIEF OPERATIVE NOTE
Immediate Post Operative Note


Operative Note


Pre-op Diagnosis:


hx of left nephrolithiasis, s/p lithotripsy, indwelling left ureteral stent 

with encrustations


Procedure:


cystoscopy, laser litholapaxy, removal of stent, ureteroscopy


Post-op Diagnosis:  same as pre-op


Surgeon:  radha


Anesthesiologist:  pancho


Anesthesia:  general


Specimen:  yes


Complications:  none


Condition:  stable


Estimated Blood Loss:  none


Drains:  none


Implant(s) used?:  No











FAN RICHMOND May 17, 2017 08:05

## 2017-05-18 VITALS — DIASTOLIC BLOOD PRESSURE: 79 MMHG | SYSTOLIC BLOOD PRESSURE: 155 MMHG

## 2017-05-18 NOTE — 48 HOUR POST ANESTHESIA EVAL
Post Anesthesia Evaluation


Procedure:  Cystoscopy, stent removal left


Date of Evaluation:  May 18, 2017


Time of Evaluation:  11:45


Blood Pressure Systolic:  155


0:  79


Pulse Rate:  70


Respiratory Rate:  18


Temperature (Fahrenheit):  97


O2 Sat by Pulse Oximetry:  97


Airway:  patent


Nausea:  No


Vomiting:  No


Pain Intensity:  0


Hydration Status:  adequate


Cardiopulmonary Status:


at baseline


Mental Status/LOC:  patient returned to baseline


Post-Anesthesia Complications:


0


Follow-up care needed:  ready to discharge











NI ALBERTO M.D. May 18, 2017 15:22

## 2017-05-18 NOTE — OPERATIVE NOTE - DICTATED
DATE OF OPERATION:  05/17/2017



PREOPERATIVE DIAGNOSIS:  History of left nephrolithiasis, status post

lithotripsy with indwelling left ureteral stent with bladder calculi and

encrustations in the distal part of the stent.



POSTOPERATIVE DIAGNOSIS:  History of left nephrolithiasis, status

post lithotripsy with indwelling left ureteral stent with bladder calculi

and encrustations in the distal part of the stent.



PROCEDURE PERFORMED:  Cystoscopy with calibration, laser

cystolitholapaxy, removal of left ureteral stent, left ureteroscopy.



SURGEON:  Gavino Haro M.D.



ANESTHESIOLOGIST:  Dr. Monk



ANESTHESIA:  General.



INDICATION FOR PROCEDURE:  The patient is a pleasant 66-year-old

female.  She has a history of multiple left renal calculi with

hydronephrosis.  She is about 2 months status post lithotripsy with both

laser as well as shockwave and stent placement.  The stone fragments have,

for the most part, cleared.  The patient came to the office last week for

stent removal and a fair amount of encrustations was noted on the distal

part of the stent and overall, an attempt was made to remove the stent in

the office, however, the patient could not relax well and there was a fair

amount of encrustations and I was not able to remove the stent.   As such,

she requested to be scheduled in the hospital with general anesthesia and

as such, she was scheduled for the above procedure.  Possible risks and

complications of bleeding, infection, anesthesia, damage to the urethra,

bladder, ureter etc.  were discussed.  No guarantees were given or

implied.



FINDINGS:  Encrustations on the distal part of the stent.  Stent was

removed intact.   Ureteroscopy did not reveal any residue involving the

ureter.



PROCEDURE IN DETAIL:  Informed consent was obtained from the patient.

The patient was brought to the operating room and placed in supine

position.   Successful general anesthesia was induced.  The patient was

placed in a modified dorsal lithotomy position and her genitalia was

prepped and draped in the usual sterile fashion.  Preoperative IV

antibiotics were administered.  Time-out was performed.  At this point,

the urethral meatus was gently dilated.  Cystoscopy was performed.  The

bladder was irrigated.  The distal part of the stent was visualized.

There was a fair amount of encrustations noted.  At this point, using the

holmium laser fiber, the encrustations were sequentially taken down.

There was some _____ changes on the left ureteral orifice.  At this point,

I was able to grab the stent with a grasper and was pulled out intact.

The residual calcifications were evacuated from the bladder.   At this

point, the ureteroscopy was performed and I was able to get into the

ureter without difficulty as it was dilated because of recent stent.  The

ureteroscopy did not reveal any residual fragments along the length of the

ureter.  The scope was then removed.  The bladder was emptied.  The

patient was awakened and taken to the recovery room in stable condition.

Blood loss was minimal.  No complication.









  ______________________________________________

  Gavino Haro M.D.





DR:  Ranjith

D:  05/18/2017 11:51

T:  05/18/2017 22:41

JOB#:  [id number]

CC:  Gale Velasquez M.D.; Fax#:  [phone]

## 2017-06-04 ENCOUNTER — HOSPITAL ENCOUNTER (EMERGENCY)
Dept: HOSPITAL 72 - EMR | Age: 67
Discharge: HOME | End: 2017-06-04
Payer: MEDICARE

## 2017-06-04 VITALS — DIASTOLIC BLOOD PRESSURE: 49 MMHG | SYSTOLIC BLOOD PRESSURE: 120 MMHG

## 2017-06-04 VITALS — BODY MASS INDEX: 33.32 KG/M2 | HEIGHT: 65 IN | WEIGHT: 200 LBS

## 2017-06-04 VITALS — DIASTOLIC BLOOD PRESSURE: 51 MMHG | SYSTOLIC BLOOD PRESSURE: 118 MMHG

## 2017-06-04 DIAGNOSIS — Z88.8: ICD-10-CM

## 2017-06-04 DIAGNOSIS — R10.84: Primary | ICD-10-CM

## 2017-06-04 DIAGNOSIS — L76.34: ICD-10-CM

## 2017-06-04 DIAGNOSIS — I10: ICD-10-CM

## 2017-06-04 DIAGNOSIS — K21.9: ICD-10-CM

## 2017-06-04 DIAGNOSIS — E11.9: ICD-10-CM

## 2017-06-04 DIAGNOSIS — N30.00: ICD-10-CM

## 2017-06-04 DIAGNOSIS — J45.909: ICD-10-CM

## 2017-06-04 DIAGNOSIS — J44.9: ICD-10-CM

## 2017-06-04 LAB
ALBUMIN/GLOB SERPL: 0.6 {RATIO} (ref 1–2.7)
ALT SERPL-CCNC: 16 U/L (ref 3–33)
ANION GAP SERPL CALC-SCNC: 19 MMOL/L (ref 5–15)
APPEARANCE UR: (no result)
AST SERPL-CCNC: 26 U/L (ref 5–40)
BACTERIA #/AREA URNS HPF: (no result) /HPF
BASOPHILS NFR BLD AUTO: 0.9 % (ref 0–2)
CALCIUM SERPL-MCNC: 10.1 MG/DL (ref 8.6–10.2)
CHLORIDE SERPL-SCNC: 95 MEQ/L (ref 98–107)
CO2 SERPL-SCNC: 25 MEQ/L (ref 20–30)
CREAT SERPL-MCNC: 1.4 MG/DL (ref 0.5–0.9)
EOSINOPHIL NFR BLD AUTO: 1.7 % (ref 0–3)
ERYTHROCYTE [DISTWIDTH] IN BLOOD BY AUTOMATED COUNT: 12.7 % (ref 11.6–14.8)
GFR SERPLBLD BASED ON 1.73 SQ M-ARVRAT: 45.5 ML/MIN (ref 60–?)
GLOBULIN SER-MCNC: 5.2 G/DL
HEMOLYSIS: 217
KETONES UR QL STRIP: NEGATIVE
LEUKOCYTE ESTERASE UR QL STRIP: (no result)
LIPASE SERPL-CCNC: 48 U/L (ref ?–60)
LYMPHOCYTES NFR BLD AUTO: 26.3 % (ref 20–45)
MCH RBC QN AUTO: 27.2 PG (ref 27–31)
MCHC RBC AUTO-ENTMCNC: 32.7 G/DL (ref 32–36)
MCV RBC AUTO: 83 FL (ref 80–99)
MONOCYTES NFR BLD AUTO: 6.9 % (ref 1–10)
NEUTROPHILS NFR BLD AUTO: 64.2 % (ref 45–75)
NITRITE UR QL STRIP: NEGATIVE
PH UR STRIP: 8 [PH] (ref 4.5–8)
PLATELET # BLD: 373 K/UL (ref 150–450)
PMV BLD AUTO: 8.2 FL (ref 6.5–10.1)
POTASSIUM SERPL-SCNC: 4.6 MEQ/L (ref 3.4–4.9)
PROT SERPL-MCNC: 8.7 G/DL (ref 6.6–8.7)
PROT UR QL STRIP: NEGATIVE
RBC # BLD AUTO: 4.24 M/UL (ref 4.2–5.4)
RBC #/AREA URNS HPF: (no result) /HPF (ref 0–2)
SODIUM SERPL-SCNC: 139 MEQ/L (ref 135–145)
SP GR UR STRIP: 1.01 (ref 1–1.03)
SQUAMOUS #/AREA URNS LPF: (no result) /LPF
TROPONIN I SERPL-MCNC: < 0.3 NG/ML (ref ?–0.3)
UROBILINOGEN UR-MCNC: NORMAL MG/DL (ref 0–1)
WBC # BLD AUTO: 6.7 K/UL (ref 4.8–10.8)
WBC #/AREA URNS HPF: (no result) /HPF (ref 0–2)

## 2017-06-04 PROCEDURE — 80053 COMPREHEN METABOLIC PANEL: CPT

## 2017-06-04 PROCEDURE — 85025 COMPLETE CBC W/AUTO DIFF WBC: CPT

## 2017-06-04 PROCEDURE — 84484 ASSAY OF TROPONIN QUANT: CPT

## 2017-06-04 PROCEDURE — 93005 ELECTROCARDIOGRAM TRACING: CPT

## 2017-06-04 PROCEDURE — 96375 TX/PRO/DX INJ NEW DRUG ADDON: CPT

## 2017-06-04 PROCEDURE — 83690 ASSAY OF LIPASE: CPT

## 2017-06-04 PROCEDURE — 96374 THER/PROPH/DIAG INJ IV PUSH: CPT

## 2017-06-04 PROCEDURE — 96360 HYDRATION IV INFUSION INIT: CPT

## 2017-06-04 PROCEDURE — 74176 CT ABD & PELVIS W/O CONTRAST: CPT

## 2017-06-04 PROCEDURE — 36415 COLL VENOUS BLD VENIPUNCTURE: CPT

## 2017-06-04 PROCEDURE — 99284 EMERGENCY DEPT VISIT MOD MDM: CPT

## 2017-06-04 PROCEDURE — 81003 URINALYSIS AUTO W/O SCOPE: CPT

## 2017-06-04 NOTE — EMERGENCY ROOM REPORT
History of Present Illness


General


Chief Complaint:  Abdominal Pain


Source:  Patient





Present Illness


HPI


66YOF with 2 days abd pain with "some diarrhea."  No nausea/vomiting, fever/

chills.  History of ventral hernia repair with mesh "about 2 years ago" and 

kidney stent placement and removal in last couple of months.  Denies urinary 

complaints.  Took multiple doses of pepto bismol since symptoms onset.  CTAP in 

March didnt show diverticilits/osis.


Allergies:  


Coded Allergies:  


     DIAZEPAM (Verified  Allergy, Mild, Itching, 12/13/15)


     DOBUTAMINE (Verified  Allergy, Unknown, 12/13/15)


     SOLIFENACIN (Verified  Allergy, Unknown, 6/4/17)





Patient History


Past Medical History:  none


Past Surgical History:  dionne


Pertinent Family History:  none


Social History:  Denies: alcohol use, drug use, smoking


Pregnant Now:  No


Immunizations:  UTD


Reviewed Nursing Documentation:  PMH: Agreed, PSxH: Agreed





Nursing Documentation-PMH


Past Medical History:  No History, Except For


Hx Hypertension:  Yes


Hx Asthma:  Yes


Hx COPD:  Yes


Hx Diabetes:  Yes - type 2


Hx Cancer:  No


Hx Gastrointestinal Problems:  Yes - GERD


Hx Neurological Problems:  No





Review of Systems


All Other Systems:  negative except mentioned in HPI





Physical Exam





Vital Signs








  Date Time  Temp Pulse Resp B/P Pulse Ox O2 Delivery O2 Flow Rate FiO2


 


6/4/17 13:21 97.2 86 20 111/68 97 Room Air  








Sp02 EP Interpretation:  reviewed, normal


General Appearance:  normal inspection, well appearing, no apparent distress, 

alert, GCS 15, non-toxic, obese


Head:  normocephalic, atraumatic


Eyes:  bilateral eye EOMI, bilateral eye PERRL


ENT:  normal ENT inspection, hearing grossly normal, normal voice


Neck:  normal inspection, full range of motion, supple, no bony tend


Respiratory:  normal inspection, lungs clear, normal breath sounds, no 

respiratory distress, no retraction, no wheezing


Cardiovascular #1:  regular rate, rhythm, no edema


Gastrointestinal:  normal inspection, normal bowel sounds, non tender, soft, no 

guarding, no hernia


Genitourinary:  no CVA tenderness


Musculoskeletal:  normal inspection, back normal, normal range of motion, Saurav'

s Sign negative


Neurologic:  normal inspection, alert, oriented x3, responsive, CNs III-XII nml 

as tested, motor strength/tone normal, speech normal


Psychiatric:  normal inspection, judgement/insight normal, mood/affect normal


Skin:  normal inspection, normal color, no rash





Medical Decision Making


Diagnostic Impression:  


 Primary Impression:  


 Abdominal pain


 Qualified Codes:  R10.84 - Generalized abdominal pain


 Additional Impressions:  


 Dark stools


 Seroma


 UTI (urinary tract infection)


 Qualified Codes:  N30.00 - Acute cystitis without hematuria


ER Course


Abd pain


- Afebrile.  VSS.


- Lipase, LFTs normal


- Elevated serumCr c/w previous.  Likely CKD


- ECG is NSR. Troponin 0.  Unlikely ACS as cause given no ischemia, neg trop 

and duration symptoms for 2 days


- CTAP: 2.2cm periumbilical soft tissue anterior fluid collection.  Left 

hydroureternephrosis without obstruction.  No free fluid or free air.  Bowel 

normal


**soft tissue fluid collection possibly post-op seroma. Advised GenSurg 

followup.  No sign of abscess or infection warranting GenSUrg consult here or 

Abx





UTI


- Leuks in UA


- Initial rocephin given in ED


- DC with macrobid





Dark stool


- Not tachycardia or hypotensive


- Likely from multiple doses of Pepto bismol





DC home





Last Vital Signs








  Date Time  Temp Pulse Resp B/P Pulse Ox O2 Delivery O2 Flow Rate FiO2


 


6/4/17 13:31 98.0 79 22 118/51 96 Room Air  








Status:  improved


Disposition:  HOME, SELF-CARE


Referrals:  


NON PHYSICIAN (PCP)











PURA CHIRINOS M.D. Jun 4, 2017 14:54

## 2017-06-05 NOTE — DIAGNOSTIC IMAGING REPORT
Indication: Abdominal pain



Technique: Continuous helical transaxial imaging of the abdomen and pelvis was

obtained from the lung bases to the pubic symphysis. No intravenous contrast 

was

administered. Coronal 2-D reformats were also obtained.



Total Dose length Product (DLP):  828 mGycm



CT Dose Index Volume (CTDIvol):   17 mGy



Comparison: 3/14/17 noncontrast CT abdomen pelvis



Findings: There is persistent hydronephrosis involving the upper portions of the

left collecting system including the renal pelvis proximal ureter and calyces. The

large stone previously demonstrated in the left UPJ has resolved. However there is 

a

suggestion of a few tiny hyperdense foci within the ureter (for example, image

81-83, 91, 95 of series 3). Previously demonstrated stones are quite large in the

left UPJ and one of the calyces in the left kidney have resolved indicating 

that

lithotripsy was probably done. Please correlate with the procedural or surgical

history. Several small nonobstructive stones are demonstrated within the right

kidney with none of the stones measuring more than 2-3 mm. There is no

hydronephrosis on the right.



There is a fat-containing hernia involving the ventral abdominal wall. At the

posterior end of the umbilicus there is a cystic focus measuring 2.4 cm. There is a

left total hip prosthesis that obscures imaging of the left hemipelvis and bladder.

The ureter is not seen distally in this location. Arterial vascular consultations

are present. Cholecystectomy noted. Trace pericardial fluid versus thickening 

noted.

Posterior basilar atelectasis demonstrated within the lungs.



Impression:



Moderate left hydronephrosis. Suspect tiny stones within the left ureter, 

possibly

sequela of previous lithotripsy. Please correlate clinically. No large stones

identified as was the case in the prior occasion.



Multiple small nonobstructive stone in the right kidney noted. Scarring in the upper

pole the right kidney noted.



Status post cholecystectomy.



Small subcutaneous cystic focus 2.5 cm at the level the umbilicus. This may be

associated with prior surgery. This may be a small postsurgical fluid collection

such as a seroma.



Fat-containing ventral abdominal hernia.



Posterior basilar atelectasis



Trace pericardial fluid versus thickening.



Left total hip prosthesis with streak artifact obscuring portion of the pelvis.



Diverticulosis of the colon. No definite diverticulitis.









The CT scanner at [facility] is accredited by the American College 

of

Radiology and the scans are performed using dose optimization techniques as

appropriate to a performed exam including Automatic Exposure control.

## 2017-07-14 ENCOUNTER — HOSPITAL ENCOUNTER (INPATIENT)
Dept: HOSPITAL 72 - EMR | Age: 67
LOS: 7 days | Discharge: HOME | DRG: 301 | End: 2017-07-21
Payer: MEDICARE

## 2017-07-14 VITALS — SYSTOLIC BLOOD PRESSURE: 133 MMHG | DIASTOLIC BLOOD PRESSURE: 71 MMHG

## 2017-07-14 VITALS — SYSTOLIC BLOOD PRESSURE: 125 MMHG | DIASTOLIC BLOOD PRESSURE: 74 MMHG

## 2017-07-14 VITALS — SYSTOLIC BLOOD PRESSURE: 137 MMHG | DIASTOLIC BLOOD PRESSURE: 74 MMHG

## 2017-07-14 VITALS — WEIGHT: 204 LBS | BODY MASS INDEX: 33.99 KG/M2 | HEIGHT: 65 IN

## 2017-07-14 VITALS — SYSTOLIC BLOOD PRESSURE: 128 MMHG | DIASTOLIC BLOOD PRESSURE: 74 MMHG

## 2017-07-14 DIAGNOSIS — K59.00: ICD-10-CM

## 2017-07-14 DIAGNOSIS — E87.6: ICD-10-CM

## 2017-07-14 DIAGNOSIS — Z88.6: ICD-10-CM

## 2017-07-14 DIAGNOSIS — I10: ICD-10-CM

## 2017-07-14 DIAGNOSIS — J44.9: ICD-10-CM

## 2017-07-14 DIAGNOSIS — E83.42: ICD-10-CM

## 2017-07-14 DIAGNOSIS — Z91.81: ICD-10-CM

## 2017-07-14 DIAGNOSIS — I82.441: Primary | ICD-10-CM

## 2017-07-14 DIAGNOSIS — E11.9: ICD-10-CM

## 2017-07-14 DIAGNOSIS — I82.4Z1: ICD-10-CM

## 2017-07-14 DIAGNOSIS — K21.9: ICD-10-CM

## 2017-07-14 DIAGNOSIS — Z88.8: ICD-10-CM

## 2017-07-14 LAB
ALBUMIN/GLOB SERPL: 0.9 {RATIO} (ref 1–2.7)
ALT SERPL-CCNC: 11 U/L (ref 3–33)
ANION GAP SERPL CALC-SCNC: 14 MMOL/L (ref 5–15)
APTT BLD: 29 SEC (ref 23–33)
AST SERPL-CCNC: 14 U/L (ref 5–40)
BASOPHILS NFR BLD AUTO: 1.4 % (ref 0–2)
CALCIUM SERPL-MCNC: 9.9 MG/DL (ref 8.6–10.2)
CHLORIDE SERPL-SCNC: 99 MEQ/L (ref 98–107)
CO2 SERPL-SCNC: 25 MEQ/L (ref 20–30)
CREAT SERPL-MCNC: 1 MG/DL (ref 0.5–0.9)
EOSINOPHIL NFR BLD AUTO: 2 % (ref 0–3)
ERYTHROCYTE [DISTWIDTH] IN BLOOD BY AUTOMATED COUNT: 14.1 % (ref 11.6–14.8)
GFR SERPLBLD BASED ON 1.73 SQ M-ARVRAT: > 60 ML/MIN (ref 60–?)
GLOBULIN SER-MCNC: 3.9 G/DL
HEMOLYSIS: 0
INR PPP: 0.9 (ref 0.9–1.1)
LYMPHOCYTES NFR BLD AUTO: 41.3 % (ref 20–45)
MCH RBC QN AUTO: 29.5 PG (ref 27–31)
MCHC RBC AUTO-ENTMCNC: 34.9 G/DL (ref 32–36)
MCV RBC AUTO: 85 FL (ref 80–99)
MONOCYTES NFR BLD AUTO: 6.2 % (ref 1–10)
NEUTROPHILS NFR BLD AUTO: 49.1 % (ref 45–75)
PLATELET # BLD: 246 K/UL (ref 150–450)
PMV BLD AUTO: 8.4 FL (ref 6.5–10.1)
POTASSIUM SERPL-SCNC: 3.5 MEQ/L (ref 3.4–4.9)
PROT SERPL-MCNC: 7.7 G/DL (ref 6.6–8.7)
PROTHROMBIN TIME: 9.9 SEC (ref 9.3–11.5)
RBC # BLD AUTO: 3.8 M/UL (ref 4.2–5.4)
SODIUM SERPL-SCNC: 138 MEQ/L (ref 135–145)
WBC # BLD AUTO: 6.7 K/UL (ref 4.8–10.8)

## 2017-07-14 PROCEDURE — 85730 THROMBOPLASTIN TIME PARTIAL: CPT

## 2017-07-14 PROCEDURE — 85025 COMPLETE CBC W/AUTO DIFF WBC: CPT

## 2017-07-14 PROCEDURE — 80061 LIPID PANEL: CPT

## 2017-07-14 PROCEDURE — 84480 ASSAY TRIIODOTHYRONINE (T3): CPT

## 2017-07-14 PROCEDURE — 84439 ASSAY OF FREE THYROXINE: CPT

## 2017-07-14 PROCEDURE — 85610 PROTHROMBIN TIME: CPT

## 2017-07-14 PROCEDURE — 80048 BASIC METABOLIC PNL TOTAL CA: CPT

## 2017-07-14 PROCEDURE — 93970 EXTREMITY STUDY: CPT

## 2017-07-14 PROCEDURE — 83735 ASSAY OF MAGNESIUM: CPT

## 2017-07-14 PROCEDURE — 83036 HEMOGLOBIN GLYCOSYLATED A1C: CPT

## 2017-07-14 PROCEDURE — 80053 COMPREHEN METABOLIC PANEL: CPT

## 2017-07-14 PROCEDURE — 94664 DEMO&/EVAL PT USE INHALER: CPT

## 2017-07-14 PROCEDURE — 82962 GLUCOSE BLOOD TEST: CPT

## 2017-07-14 PROCEDURE — 36415 COLL VENOUS BLD VENIPUNCTURE: CPT

## 2017-07-14 PROCEDURE — 84443 ASSAY THYROID STIM HORMONE: CPT

## 2017-07-14 PROCEDURE — 70450 CT HEAD/BRAIN W/O DYE: CPT

## 2017-07-14 RX ADMIN — INSULIN ASPART SCH UNITS: 100 INJECTION, SOLUTION INTRAVENOUS; SUBCUTANEOUS at 22:31

## 2017-07-14 RX ADMIN — HYDROCODONE BITARTRATE AND ACETAMINOPHEN PRN EA: 10; 325 TABLET ORAL at 23:50

## 2017-07-14 NOTE — DIAGNOSTIC IMAGING REPORT
Indications: Attain



Technique: Spiral acquisitions obtained through the brain. Angled axial and coronal

5 x 5 mm slices were reconstructed. Total dose length product 1365 mGycm.  CTDI

vol(s) 70 mGy. Dose reduction achieved using automated exposure control



Comparison: None



Findings: No acute hemorrhage or edema. No mass effect or midline shift. There is

mild age-related enlargement of ventricles and extra-axial CSF spaces. Intact

calvarium. Visualized orbits and sinuses are unremarkable.



Impression: Mild age-related changes. Negative for acute intracranial bleed or 

mass

effect











The CT scanner at [facility] is accredited by the [employer] College 

of

Radiology and the scans are performed using protocols designed to limit 

radiation

exposure to as low as reasonably achievable to attain images of sufficient

resolution adequate for diagnostic evaluation.

## 2017-07-14 NOTE — EMERGENCY ROOM REPORT
History of Present Illness


General


Chief Complaint:  General Complaint


Source:  Patient





Present Illness


HPI


Patient is a 66-year-old female presented after increased right lower extremity 

pain and swelling.  Patient was sent in for evaluation after recent fall.  

Patient reported having increased pain to her right leg.  Patient stated that 

she had been having swelling for several days.  Patient was noted to have 

recently fallen and hit her head.  Patient stated that she had some right-sided 

headache as well as pain to her hands.


Allergies:  


Coded Allergies:  


     DIAZEPAM (Verified  Allergy, Mild, Itching, 12/13/15)


     DOBUTAMINE (Verified  Allergy, Unknown, 12/13/15)


     MORPHINE (Verified  Allergy, Unknown, 6/4/17)


     SOLIFENACIN (Verified  Allergy, Unknown, 6/4/17)





Patient History


Past Medical History:  see triage record


Reviewed Nursing Documentation:  PMH: Agreed, PSxH: Agreed





Nursing Documentation-PMH


Hx Hypertension:  Yes


Hx Asthma:  Yes


Hx COPD:  Yes


Hx Diabetes:  Yes - type 2


Hx Cancer:  No


Hx Gastrointestinal Problems:  Yes - GERD


Hx Neurological Problems:  No





Review of Systems


All Other Systems:  negative except mentioned in HPI





Physical Exam





Vital Signs








  Date Time  Temp Pulse Resp B/P Pulse Ox O2 Delivery O2 Flow Rate FiO2


 


7/14/17 11:54 97.7 67 20 134/72 100 Room Air  








Sp02 EP Interpretation:  reviewed, normal


General Appearance:  normal inspection, well appearing, no apparent distress, 

alert, GCS 15


Head:  atraumatic


ENT:  normal ENT inspection, hearing grossly normal, normal voice


Neck:  normal inspection, full range of motion, supple, no bony tend


Respiratory:  normal inspection, lungs clear, normal breath sounds, no 

respiratory distress, no retraction, no wheezing


Cardiovascular #1:  regular rate, rhythm, no edema


Gastrointestinal:  normal inspection, normal bowel sounds, non tender, soft, no 

guarding, no hernia


Genitourinary:  no CVA tenderness


Musculoskeletal:  back normal, swelling - right leg


Neurologic:  normal inspection, alert, responsive, speech normal, motor weakness

, oriented


Psychiatric:  normal inspection, judgement/insight normal, mood/affect normal


Skin:  normal inspection, normal color, no rash





Medical Decision Making


Diagnostic Impression:  


 Primary Impression:  


 Deep venous thrombosis


ER Course


Patient presented for extremities pain and swelling.  Differential diagnoses 

included wasn't limited to fracture, deep venous thrombosis, sprain among 

others.Because of complexity of patient's case laboratory testing and imaging 

studies were ordered.


The x-ray imaging 3 view of the right ankle showed no evident fracture or 

dislocation.  A duplex ultrasound of the right lower extremity showed evident 

deep venous thrombosis.  Patient started on anticoagulation.  Dr. Isaacs was 

contacted for inpatient management





Labs








Test


  7/14/17


15:15


 


White Blood Count


  6.7 K/UL


(4.8-10.8)


 


Red Blood Count


  3.80 M/UL


(4.20-5.40)


 


Hemoglobin


  11.2 G/DL


(12.0-16.0)


 


Hematocrit


  32.2 %


(37.0-47.0)


 


Mean Corpuscular Volume 85 FL (80-99) 


 


Mean Corpuscular Hemoglobin


  29.5 PG


(27.0-31.0)


 


Mean Corpuscular Hemoglobin


Concent 34.9 G/DL


(32.0-36.0)


 


Red Cell Distribution Width


  14.1 %


(11.6-14.8)


 


Platelet Count


  246 K/UL


(150-450)


 


Mean Platelet Volume


  8.4 FL


(6.5-10.1)


 


Neutrophils (%) (Auto)


  49.1 %


(45.0-75.0)


 


Lymphocytes (%) (Auto)


  41.3 %


(20.0-45.0)


 


Monocytes (%) (Auto)


  6.2 %


(1.0-10.0)


 


Eosinophils (%) (Auto)


  2.0 %


(0.0-3.0)


 


Basophils (%) (Auto)


  1.4 %


(0.0-2.0)


 


Prothrombin Time


  9.9 SEC


(9.30-11.50)


 


Prothromb Time International


Ratio 0.9 (0.9-1.1) 


 


 


Activated Partial


Thromboplast Time 29 SEC (23-33) 


 











Last Vital Signs








  Date Time  Temp Pulse Resp B/P Pulse Ox O2 Delivery O2 Flow Rate FiO2


 


7/14/17 12:08 97.6 64 18 137/74 100 Room Air  








Status:  unchanged


Disposition:  ADMITTED AS INPATIENT


Condition:  Serious


Referrals:  


SINDI JIMENEZ (PCP)











Gerardo Oseguera Jul 14, 2017 14:46

## 2017-07-15 VITALS — DIASTOLIC BLOOD PRESSURE: 66 MMHG | SYSTOLIC BLOOD PRESSURE: 127 MMHG

## 2017-07-15 VITALS — SYSTOLIC BLOOD PRESSURE: 132 MMHG | DIASTOLIC BLOOD PRESSURE: 69 MMHG

## 2017-07-15 VITALS — SYSTOLIC BLOOD PRESSURE: 137 MMHG | DIASTOLIC BLOOD PRESSURE: 67 MMHG

## 2017-07-15 VITALS — DIASTOLIC BLOOD PRESSURE: 62 MMHG | SYSTOLIC BLOOD PRESSURE: 131 MMHG

## 2017-07-15 VITALS — SYSTOLIC BLOOD PRESSURE: 138 MMHG | DIASTOLIC BLOOD PRESSURE: 71 MMHG

## 2017-07-15 VITALS — DIASTOLIC BLOOD PRESSURE: 69 MMHG | SYSTOLIC BLOOD PRESSURE: 136 MMHG

## 2017-07-15 LAB
ALBUMIN/GLOB SERPL: 0.8 {RATIO} (ref 1–2.7)
ALT SERPL-CCNC: 10 U/L (ref 3–33)
ANION GAP SERPL CALC-SCNC: 12 MMOL/L (ref 5–15)
AST SERPL-CCNC: 14 U/L (ref 5–40)
BASOPHILS NFR BLD AUTO: 1.2 % (ref 0–2)
CALCIUM SERPL-MCNC: 9.5 MG/DL (ref 8.6–10.2)
CHLORIDE SERPL-SCNC: 102 MEQ/L (ref 98–107)
CHOLEST SERPL-MCNC: 125 MG/DL (ref ?–200)
CHOLEST/HDLC SERPL: 2.9 {RATIO} (ref 3.3–4.4)
CO2 SERPL-SCNC: 25 MEQ/L (ref 20–30)
CREAT SERPL-MCNC: 0.9 MG/DL (ref 0.5–0.9)
EOSINOPHIL NFR BLD AUTO: 2.8 % (ref 0–3)
ERYTHROCYTE [DISTWIDTH] IN BLOOD BY AUTOMATED COUNT: 13.7 % (ref 11.6–14.8)
GFR SERPLBLD BASED ON 1.73 SQ M-ARVRAT: > 60 ML/MIN (ref 60–?)
GLOBULIN SER-MCNC: 3.7 G/DL
HBA1C MFR BLD: 5.9 % (ref ?–6)
HEMOLYSIS: 4
INR PPP: 1 (ref 0.9–1.1)
LDLC SERPL ELPH-MCNC: 33 MG/DL (ref 60–99)
LYMPHOCYTES NFR BLD AUTO: 48.7 % (ref 20–45)
MAGNESIUM SERPL-MCNC: 1.1 MG/DL (ref 1.7–2.5)
MCH RBC QN AUTO: 28 PG (ref 27–31)
MCHC RBC AUTO-ENTMCNC: 32.3 G/DL (ref 32–36)
MCV RBC AUTO: 87 FL (ref 80–99)
MONOCYTES NFR BLD AUTO: 8.7 % (ref 1–10)
NEUTROPHILS NFR BLD AUTO: 38.6 % (ref 45–75)
PLATELET # BLD: 253 K/UL (ref 150–450)
PMV BLD AUTO: 9 FL (ref 6.5–10.1)
POTASSIUM SERPL-SCNC: 3.2 MEQ/L (ref 3.4–4.9)
PROT SERPL-MCNC: 7 G/DL (ref 6.6–8.7)
PROTHROMBIN TIME: 10.3 SEC (ref 9.3–11.5)
RBC # BLD AUTO: 3.89 M/UL (ref 4.2–5.4)
SODIUM SERPL-SCNC: 139 MEQ/L (ref 135–145)
TSH SERPL-ACNC: 4.57 UIU/ML (ref 0.3–4.5)
WBC # BLD AUTO: 5.9 K/UL (ref 4.8–10.8)

## 2017-07-15 RX ADMIN — INSULIN ASPART SCH UNITS: 100 INJECTION, SOLUTION INTRAVENOUS; SUBCUTANEOUS at 06:32

## 2017-07-15 RX ADMIN — HEPARIN SODIUM SCH MLS/HR: 5000 INJECTION, SOLUTION INTRAVENOUS at 07:02

## 2017-07-15 RX ADMIN — INSULIN ASPART SCH UNITS: 100 INJECTION, SOLUTION INTRAVENOUS; SUBCUTANEOUS at 16:21

## 2017-07-15 RX ADMIN — HYDROCODONE BITARTRATE AND ACETAMINOPHEN PRN EA: 10; 325 TABLET ORAL at 12:06

## 2017-07-15 RX ADMIN — INSULIN ASPART SCH UNITS: 100 INJECTION, SOLUTION INTRAVENOUS; SUBCUTANEOUS at 21:00

## 2017-07-15 RX ADMIN — ALPRAZOLAM SCH MG: 0.5 TABLET ORAL at 20:57

## 2017-07-15 RX ADMIN — METFORMIN HYDROCHLORIDE SCH MG: 500 TABLET ORAL at 17:20

## 2017-07-15 RX ADMIN — INSULIN ASPART SCH UNITS: 100 INJECTION, SOLUTION INTRAVENOUS; SUBCUTANEOUS at 11:30

## 2017-07-15 RX ADMIN — HYDROCODONE BITARTRATE AND ACETAMINOPHEN PRN EA: 10; 325 TABLET ORAL at 20:00

## 2017-07-15 RX ADMIN — LISINOPRIL SCH MG: 2.5 TABLET ORAL at 09:00

## 2017-07-15 RX ADMIN — BUDESONIDE SCH MG: 0.25 SUSPENSION RESPIRATORY (INHALATION) at 21:40

## 2017-07-15 RX ADMIN — CALCITRIOL SCH MCG: 0.25 CAPSULE, LIQUID FILLED ORAL at 08:48

## 2017-07-15 RX ADMIN — BUDESONIDE SCH MG: 0.25 SUSPENSION RESPIRATORY (INHALATION) at 10:00

## 2017-07-15 RX ADMIN — ASPIRIN 81 MG SCH MG: 81 TABLET ORAL at 08:47

## 2017-07-15 RX ADMIN — HYDROCODONE BITARTRATE AND ACETAMINOPHEN PRN EA: 10; 325 TABLET ORAL at 06:40

## 2017-07-15 NOTE — HISTORY AND PHYSICAL REPORT
DATE OF ADMISSION:  07/14/2017



CHIEF COMPLAINT:  The right lower extremity pain or swelling.



HISTORY OF PRESENT ILLNESS:  This is a 66-year-old lady with a

history of hypertension, diabetes, and chronic obstructive pulmonary

disease.  The patient now presented to the ER for right lower extremity,

pain and swelling and the patient had for the last two weeks and found to

have noticed right lower extremity pain and swelling and presented to the

ER.  In the ER, the patient was found to have right lower extremity DVT

and the heparin drip was started.  I saw patient this morning, denies any

nausea, vomiting, chest pain, shortness of breath and diarrhea or

abdominal pain.The right leg pain is better but still having pain.



PAST MEDICAL HISTORY:  Hypertension, diabetes, chronic obstructive

pulmonary disease, asthma, and gastroesophageal reflux disease.



PAST SURGICAL HISTORY:  None.



MEDICATION:  Reviewed.



ALLERGIES:  No known drug allergies.



FAMILY HISTORY:  Noncontributory.



SOCIAL HISTORY:  Denies smoking, alcohol, and drug use.



PHYSICAL EXAMINATION:

VITAL SIGNS:  Blood pressure of 127/66, a heart rate of 72,

respiratory rate 19, temperature 97.7, and oxygen saturation is 96% on

room air.

GENERAL:  On examination, the patient is no acute distress.  Alert,

awake, and oriented x3.

HEENT:  Atraumatic and normocephalic.

NECK:  Supple.  Trachea is the midline.

LUNGS:  Clear to auscultate bilaterally.  No rales.  No rhonchi.

ABDOMEN:  Soft and nontender.  No bowel sounds present.

EXTREMITIES:  Swelling and edema tenderness on the right lower

extremities.



LABORATORY DATA:  White count 5.9, hemoglobin 10.9, hematocrit 32.7,

and the platelet count is 253,000.  Chemistries is sodium 139, potassium

3.2, chloride 102, bicarb 25, BUN 18, and creatinine is 0.9.  Hemoglobin

A1c is 5.9.  Triglycerides is 246 and LDL is 33.  TSH is 4.5.  CT head was

negative.  Then the lower extremity venous Doppler showed acute traumas in

the peroneal and the anterior tibia of the right lower extremity.



ASSESSMENT AND PLAN:

1. Right lower extremity deep venous thrombosis.

2. s/p fall

3. Hypertension.

4. Diabetes.

5. Chronic obstructive pulmonary disease.

6. Hypokalemia.





Continue heparin drip for switch to Coumadin tonight and continue the

home medication and for the elevated TSH and over to the free T4 and T3

uptake packs and discussed with RN and patient.









  ______________________________________________

  Devora Morrissey M.D.





DR:  MMT/PM

D:  07/15/2017 09:51

T:  07/15/2017 13:49

JOB#:  [id number]

CC:



ATUL

## 2017-07-16 VITALS — DIASTOLIC BLOOD PRESSURE: 74 MMHG | SYSTOLIC BLOOD PRESSURE: 146 MMHG

## 2017-07-16 VITALS — SYSTOLIC BLOOD PRESSURE: 140 MMHG | DIASTOLIC BLOOD PRESSURE: 80 MMHG

## 2017-07-16 VITALS — SYSTOLIC BLOOD PRESSURE: 115 MMHG | DIASTOLIC BLOOD PRESSURE: 61 MMHG

## 2017-07-16 VITALS — SYSTOLIC BLOOD PRESSURE: 130 MMHG | DIASTOLIC BLOOD PRESSURE: 72 MMHG

## 2017-07-16 VITALS — DIASTOLIC BLOOD PRESSURE: 79 MMHG | SYSTOLIC BLOOD PRESSURE: 161 MMHG

## 2017-07-16 VITALS — DIASTOLIC BLOOD PRESSURE: 64 MMHG | SYSTOLIC BLOOD PRESSURE: 112 MMHG

## 2017-07-16 LAB
ANION GAP SERPL CALC-SCNC: 13 MMOL/L (ref 5–15)
BASOPHILS NFR BLD AUTO: 1.5 % (ref 0–2)
CALCIUM SERPL-MCNC: 9.7 MG/DL (ref 8.6–10.2)
CHLORIDE SERPL-SCNC: 102 MEQ/L (ref 98–107)
CO2 SERPL-SCNC: 24 MEQ/L (ref 20–30)
CREAT SERPL-MCNC: 0.8 MG/DL (ref 0.5–0.9)
EOSINOPHIL NFR BLD AUTO: 2.7 % (ref 0–3)
ERYTHROCYTE [DISTWIDTH] IN BLOOD BY AUTOMATED COUNT: 14.2 % (ref 11.6–14.8)
GFR SERPLBLD BASED ON 1.73 SQ M-ARVRAT: > 60 ML/MIN (ref 60–?)
HEMOLYSIS: 5
INR PPP: 0.9 (ref 0.9–1.1)
LYMPHOCYTES NFR BLD AUTO: 43.2 % (ref 20–45)
MAGNESIUM SERPL-MCNC: 2 MG/DL (ref 1.7–2.5)
MCH RBC QN AUTO: 27.6 PG (ref 27–31)
MCHC RBC AUTO-ENTMCNC: 31.6 G/DL (ref 32–36)
MCV RBC AUTO: 88 FL (ref 80–99)
MONOCYTES NFR BLD AUTO: 7.3 % (ref 1–10)
NEUTROPHILS NFR BLD AUTO: 45.2 % (ref 45–75)
PLATELET # BLD: 300 K/UL (ref 150–450)
PMV BLD AUTO: 8.9 FL (ref 6.5–10.1)
POTASSIUM SERPL-SCNC: 3.9 MEQ/L (ref 3.4–4.9)
PROTHROMBIN TIME: 9.8 SEC (ref 9.3–11.5)
RBC # BLD AUTO: 4.29 M/UL (ref 4.2–5.4)
SODIUM SERPL-SCNC: 139 MEQ/L (ref 135–145)
WBC # BLD AUTO: 5.2 K/UL (ref 4.8–10.8)

## 2017-07-16 RX ADMIN — ALPRAZOLAM SCH MG: 0.5 TABLET ORAL at 21:00

## 2017-07-16 RX ADMIN — CALCITRIOL SCH MCG: 0.25 CAPSULE, LIQUID FILLED ORAL at 08:17

## 2017-07-16 RX ADMIN — LISINOPRIL SCH MG: 2.5 TABLET ORAL at 08:41

## 2017-07-16 RX ADMIN — BUDESONIDE SCH MG: 0.25 SUSPENSION RESPIRATORY (INHALATION) at 10:00

## 2017-07-16 RX ADMIN — FLUTICASONE PROPIONATE AND SALMETEROL SCH PUFFS: 50; 250 POWDER RESPIRATORY (INHALATION) at 19:59

## 2017-07-16 RX ADMIN — POLYETHYLENE GLYCOL 3350 PRN GM: 17 POWDER, FOR SOLUTION ORAL at 13:51

## 2017-07-16 RX ADMIN — METFORMIN HYDROCHLORIDE SCH MG: 500 TABLET ORAL at 17:26

## 2017-07-16 RX ADMIN — HEPARIN SODIUM SCH MLS/HR: 5000 INJECTION, SOLUTION INTRAVENOUS at 17:41

## 2017-07-16 RX ADMIN — ASPIRIN 81 MG SCH MG: 81 TABLET ORAL at 08:17

## 2017-07-16 RX ADMIN — INSULIN ASPART SCH UNITS: 100 INJECTION, SOLUTION INTRAVENOUS; SUBCUTANEOUS at 21:03

## 2017-07-16 RX ADMIN — HYDROCODONE BITARTRATE AND ACETAMINOPHEN PRN EA: 10; 325 TABLET ORAL at 12:26

## 2017-07-16 RX ADMIN — HYDROCODONE BITARTRATE AND ACETAMINOPHEN PRN EA: 10; 325 TABLET ORAL at 00:21

## 2017-07-16 RX ADMIN — HEPARIN SODIUM SCH MLS/HR: 5000 INJECTION, SOLUTION INTRAVENOUS at 00:25

## 2017-07-16 RX ADMIN — METFORMIN HYDROCHLORIDE SCH MG: 500 TABLET ORAL at 08:18

## 2017-07-16 RX ADMIN — INSULIN ASPART SCH UNITS: 100 INJECTION, SOLUTION INTRAVENOUS; SUBCUTANEOUS at 05:44

## 2017-07-16 RX ADMIN — INSULIN ASPART SCH UNITS: 100 INJECTION, SOLUTION INTRAVENOUS; SUBCUTANEOUS at 16:30

## 2017-07-16 RX ADMIN — HYDROCODONE BITARTRATE AND ACETAMINOPHEN PRN EA: 10; 325 TABLET ORAL at 20:57

## 2017-07-16 RX ADMIN — INSULIN ASPART SCH UNITS: 100 INJECTION, SOLUTION INTRAVENOUS; SUBCUTANEOUS at 11:30

## 2017-07-16 NOTE — GENERAL PROGRESS NOTE
Assessment/Plan


Assessment/Plan


1. Right lower extremity deep venous thrombosis.


2. hypokalemia and hypomagnesemia--resolved


3. Hypertension.


4. Diabetes.


5. Chronic obstructive pulmonary disease.


6.constipation





Plan


Miralaax


continue heparin drip and coumadin per Rx


monitor PTT,INR





Subjective


Allergies:  


Coded Allergies:  


     DIAZEPAM (Verified  Allergy, Mild, Itching, 12/13/15)


     DOBUTAMINE (Verified  Allergy, Unknown, 12/13/15)


     MORPHINE (Verified  Allergy, Unknown, 6/4/17)


     SOLIFENACIN (Verified  Allergy, Unknown, 6/4/17)


Subjective


eating lunch. complained of constipation. R leg pain is getting better





Objective





Last 24 Hour Vital Signs








  Date Time  Temp Pulse Resp B/P Pulse Ox O2 Delivery O2 Flow Rate FiO2


 


7/16/17 10:23      Room Air 21.0 


 


7/16/17 10:22        21


 


7/16/17 10:20  88 18  97 Room Air  21


 


7/16/17 08:41  61  130/72    


 


7/16/17 08:41    130/72    


 


7/16/17 08:36 97.3 61 20 130/72 97 Room Air  


 


7/16/17 08:15  62 18   Room Air  


 


7/16/17 04:35 97.3 57 20 146/74 98   


 


7/16/17 01:33 96.8       


 


7/16/17 00:49 96.8 65 20 140/80 97 Room Air  


 


7/15/17 21:42      Room Air  


 


7/15/17 21:41        21


 


7/15/17 21:41      Room Air  


 


7/15/17 20:51 96.4 62 17 138/71 95 Room Air  


 


7/15/17 19:14  64 18   Room Air  


 


7/15/17 16:24 97.3 64 20 137/67 96 Room Air  

















Intake and Output  


 


 7/15/17 7/16/17





 19:00 07:00


 


Intake Total 1066.071 ml 346.071 ml


 


Balance 1066.071 ml 346.071 ml


 


  


 


Intake Oral 720 ml 


 


IV Total 346.071 ml 346.071 ml


 


# Voids 1 5


 


# Bowel Movements  2








Laboratory Tests


7/15/17 13:15: 


Activated Partial Thromboplast Time 65H, Magnesium Level 1.1L, Free Thyroxine 

0.98, Triiodothyronine (T3) Uptake 31


7/16/17 04:15: 


Activated Partial Thromboplast Time 86H, Magnesium Level 2.0, White Blood Count 

5.2, Red Blood Count 4.29, Hemoglobin 11.9L, Hematocrit 37.5, Mean Corpuscular 

Volume 88, Mean Corpuscular Hemoglobin 27.6, Mean Corpuscular Hemoglobin 

Concent 31.6L, Red Cell Distribution Width 14.2, Platelet Count 300, Mean 

Platelet Volume 8.9, Neutrophils (%) (Auto) 45.2, Lymphocytes (%) (Auto) 43.2, 

Monocytes (%) (Auto) 7.3, Eosinophils (%) (Auto) 2.7, Basophils (%) (Auto) 1.5, 

Prothrombin Time 9.8, Prothromb Time International Ratio 0.9, Sodium Level 139, 

Potassium Level 3.9, Chloride Level 102, Carbon Dioxide Level 24, Anion Gap 13, 

Blood Urea Nitrogen 15, Creatinine 0.8, Estimat Glomerular Filtration Rate > 60

, Glucose Level 115H, Calcium Level 9.7


Height (Feet):  5


Height (Inches):  5.00


Weight (Pounds):  204


Objective


NAD, AAOx3


CTA b/l,nor ales, no rhonchi


S1,S2,RRR,no M/R/G


soft, non tender, BS+


mild edema and tender in RLE











MESSI OLSON Jul 16, 2017 12:23

## 2017-07-17 VITALS — DIASTOLIC BLOOD PRESSURE: 60 MMHG | SYSTOLIC BLOOD PRESSURE: 120 MMHG

## 2017-07-17 VITALS — DIASTOLIC BLOOD PRESSURE: 76 MMHG | SYSTOLIC BLOOD PRESSURE: 123 MMHG

## 2017-07-17 VITALS — SYSTOLIC BLOOD PRESSURE: 140 MMHG | DIASTOLIC BLOOD PRESSURE: 74 MMHG

## 2017-07-17 VITALS — DIASTOLIC BLOOD PRESSURE: 69 MMHG | SYSTOLIC BLOOD PRESSURE: 141 MMHG

## 2017-07-17 VITALS — SYSTOLIC BLOOD PRESSURE: 116 MMHG | DIASTOLIC BLOOD PRESSURE: 68 MMHG

## 2017-07-17 LAB
INR PPP: 1 (ref 0.9–1.1)
PROTHROMBIN TIME: 10.1 SEC (ref 9.3–11.5)

## 2017-07-17 RX ADMIN — HYDROCODONE BITARTRATE AND ACETAMINOPHEN PRN EA: 10; 325 TABLET ORAL at 03:36

## 2017-07-17 RX ADMIN — INSULIN ASPART SCH UNITS: 100 INJECTION, SOLUTION INTRAVENOUS; SUBCUTANEOUS at 11:30

## 2017-07-17 RX ADMIN — POLYETHYLENE GLYCOL 3350 PRN GM: 17 POWDER, FOR SOLUTION ORAL at 09:06

## 2017-07-17 RX ADMIN — HYDROCODONE BITARTRATE AND ACETAMINOPHEN PRN EA: 10; 325 TABLET ORAL at 19:43

## 2017-07-17 RX ADMIN — INSULIN ASPART SCH UNITS: 100 INJECTION, SOLUTION INTRAVENOUS; SUBCUTANEOUS at 20:47

## 2017-07-17 RX ADMIN — HYDROCODONE BITARTRATE AND ACETAMINOPHEN PRN EA: 10; 325 TABLET ORAL at 12:13

## 2017-07-17 RX ADMIN — INSULIN ASPART SCH UNITS: 100 INJECTION, SOLUTION INTRAVENOUS; SUBCUTANEOUS at 05:55

## 2017-07-17 RX ADMIN — INSULIN ASPART SCH UNITS: 100 INJECTION, SOLUTION INTRAVENOUS; SUBCUTANEOUS at 16:30

## 2017-07-17 RX ADMIN — HEPARIN SODIUM SCH MLS/HR: 5000 INJECTION, SOLUTION INTRAVENOUS at 10:23

## 2017-07-17 RX ADMIN — ASPIRIN 81 MG SCH MG: 81 TABLET ORAL at 09:05

## 2017-07-17 RX ADMIN — METFORMIN HYDROCHLORIDE SCH MG: 500 TABLET ORAL at 17:53

## 2017-07-17 RX ADMIN — LISINOPRIL SCH MG: 2.5 TABLET ORAL at 09:05

## 2017-07-17 RX ADMIN — FLUTICASONE PROPIONATE AND SALMETEROL SCH PUFFS: 50; 250 POWDER RESPIRATORY (INHALATION) at 09:10

## 2017-07-17 RX ADMIN — FLUTICASONE PROPIONATE AND SALMETEROL SCH PUFFS: 50; 250 POWDER RESPIRATORY (INHALATION) at 20:08

## 2017-07-17 RX ADMIN — CALCITRIOL SCH MCG: 0.25 CAPSULE, LIQUID FILLED ORAL at 09:05

## 2017-07-17 RX ADMIN — METFORMIN HYDROCHLORIDE SCH MG: 500 TABLET ORAL at 09:05

## 2017-07-17 RX ADMIN — ALPRAZOLAM SCH MG: 0.5 TABLET ORAL at 20:52

## 2017-07-17 NOTE — GENERAL PROGRESS NOTE
Assessment/Plan


Assessment/Plan


Rt. Leg DVT. INR Subtherapeutic. On heparin Drip.





Subjective


Allergies:  


Coded Allergies:  


     DIAZEPAM (Verified  Allergy, Mild, Itching, 12/13/15)


     DOBUTAMINE (Verified  Allergy, Unknown, 12/13/15)


     MORPHINE (Verified  Allergy, Unknown, 6/4/17)


     SOLIFENACIN (Verified  Allergy, Unknown, 6/4/17)


Subjective


No c/o





Objective





Last 24 Hour Vital Signs








  Date Time  Temp Pulse Resp B/P Pulse Ox O2 Delivery O2 Flow Rate FiO2


 


7/17/17 16:14 98.1 65 18 123/76 96 Room Air  


 


7/17/17 12:00 97.0 56 18 141/69 97 Room Air  


 


7/17/17 09:11      Room Air  21


 


7/17/17 09:11  73 18   Room Air  21


 


7/17/17 09:11      Room Air  21


 


7/17/17 09:05    120/60    


 


7/17/17 09:00  73  120/60    


 


7/17/17 08:00 96.7 63 20 120/60 98 Room Air  


 


7/17/17 04:35 98.6       


 


7/17/17 04:10 98.6 58 18 116/68 96 Room Air  


 


7/16/17 20:00 98.0 64 18 112/64 96 Room Air  


 


7/16/17 19:30      Room Air  21


 


7/16/17 19:30      Room Air  21


 


7/16/17 19:30  68 18   Room Air  21

















Intake and Output  


 


 7/16/17 7/17/17





 19:00 07:00


 


Intake Total 706.071 ml 746.071 ml


 


Balance 706.071 ml 746.071 ml


 


  


 


Intake Oral 360 ml 400 ml


 


IV Total 346.071 ml 346.071 ml


 


# Voids 2 5








Laboratory Tests


7/17/17 03:50: 


Prothrombin Time 10.1, Prothromb Time International Ratio 1.0, Activated 

Partial Thromboplast Time 79H


Height (Feet):  5


Height (Inches):  5.00


Weight (Pounds):  204


Objective


Cv RR


Lungs CTA


Abd SNT .BS +


E no YOLIEE











SINDI JIMENEZ Jul 17, 2017 17:17

## 2017-07-17 NOTE — DIAGNOSTIC IMAGING REPORT
Indication: PAIN



Technique: 3 views of the right ankle



Comparison: none



Findings: No acute fractures. No dislocations. Joint spaces are preserved



Impression: Negative

## 2017-07-18 VITALS — DIASTOLIC BLOOD PRESSURE: 62 MMHG | SYSTOLIC BLOOD PRESSURE: 118 MMHG

## 2017-07-18 VITALS — SYSTOLIC BLOOD PRESSURE: 161 MMHG | DIASTOLIC BLOOD PRESSURE: 78 MMHG

## 2017-07-18 VITALS — DIASTOLIC BLOOD PRESSURE: 59 MMHG | SYSTOLIC BLOOD PRESSURE: 109 MMHG

## 2017-07-18 VITALS — SYSTOLIC BLOOD PRESSURE: 132 MMHG | DIASTOLIC BLOOD PRESSURE: 63 MMHG

## 2017-07-18 VITALS — SYSTOLIC BLOOD PRESSURE: 110 MMHG | DIASTOLIC BLOOD PRESSURE: 46 MMHG

## 2017-07-18 VITALS — DIASTOLIC BLOOD PRESSURE: 78 MMHG | SYSTOLIC BLOOD PRESSURE: 148 MMHG

## 2017-07-18 VITALS — DIASTOLIC BLOOD PRESSURE: 70 MMHG | SYSTOLIC BLOOD PRESSURE: 145 MMHG

## 2017-07-18 LAB
INR PPP: 1 (ref 0.9–1.1)
PROTHROMBIN TIME: 10.3 SEC (ref 9.3–11.5)

## 2017-07-18 RX ADMIN — HYDROCODONE BITARTRATE AND ACETAMINOPHEN PRN EA: 10; 325 TABLET ORAL at 12:20

## 2017-07-18 RX ADMIN — HEPARIN SODIUM SCH MLS/HR: 5000 INJECTION, SOLUTION INTRAVENOUS at 03:33

## 2017-07-18 RX ADMIN — HYDROCODONE BITARTRATE AND ACETAMINOPHEN PRN EA: 10; 325 TABLET ORAL at 20:25

## 2017-07-18 RX ADMIN — INSULIN ASPART SCH UNITS: 100 INJECTION, SOLUTION INTRAVENOUS; SUBCUTANEOUS at 17:28

## 2017-07-18 RX ADMIN — INSULIN ASPART SCH UNITS: 100 INJECTION, SOLUTION INTRAVENOUS; SUBCUTANEOUS at 21:00

## 2017-07-18 RX ADMIN — INSULIN ASPART SCH UNITS: 100 INJECTION, SOLUTION INTRAVENOUS; SUBCUTANEOUS at 06:10

## 2017-07-18 RX ADMIN — METFORMIN HYDROCHLORIDE SCH MG: 500 TABLET ORAL at 08:53

## 2017-07-18 RX ADMIN — LISINOPRIL SCH MG: 2.5 TABLET ORAL at 08:56

## 2017-07-18 RX ADMIN — FLUTICASONE PROPIONATE AND SALMETEROL SCH PUFFS: 50; 250 POWDER RESPIRATORY (INHALATION) at 08:30

## 2017-07-18 RX ADMIN — HYDROCODONE BITARTRATE AND ACETAMINOPHEN PRN EA: 10; 325 TABLET ORAL at 08:17

## 2017-07-18 RX ADMIN — HEPARIN SODIUM SCH MLS/HR: 5000 INJECTION, SOLUTION INTRAVENOUS at 20:30

## 2017-07-18 RX ADMIN — INSULIN ASPART SCH UNITS: 100 INJECTION, SOLUTION INTRAVENOUS; SUBCUTANEOUS at 11:30

## 2017-07-18 RX ADMIN — HYDROCODONE BITARTRATE AND ACETAMINOPHEN PRN EA: 10; 325 TABLET ORAL at 00:34

## 2017-07-18 RX ADMIN — ASPIRIN 81 MG SCH MG: 81 TABLET ORAL at 08:53

## 2017-07-18 RX ADMIN — CALCITRIOL SCH MCG: 0.25 CAPSULE, LIQUID FILLED ORAL at 08:53

## 2017-07-18 RX ADMIN — METFORMIN HYDROCHLORIDE SCH MG: 500 TABLET ORAL at 17:30

## 2017-07-18 RX ADMIN — FLUTICASONE PROPIONATE AND SALMETEROL SCH PUFFS: 50; 250 POWDER RESPIRATORY (INHALATION) at 19:37

## 2017-07-18 RX ADMIN — ALPRAZOLAM SCH MG: 0.5 TABLET ORAL at 20:24

## 2017-07-18 NOTE — GENERAL PROGRESS NOTE
Assessment/Plan


Assessment/Plan


Rt. Leg DVT. INR Subtherapeutic. On heparin Drip. Overriding pharmacy 

Coumadinization to hasten process.





Subjective


Allergies:  


Coded Allergies:  


     DIAZEPAM (Verified  Allergy, Mild, Itching, 12/13/15)


     DOBUTAMINE (Verified  Allergy, Unknown, 12/13/15)


     MORPHINE (Verified  Allergy, Unknown, 6/4/17)


     SOLIFENACIN (Verified  Allergy, Unknown, 6/4/17)


Subjective


No c/o





Objective





Last 24 Hour Vital Signs








  Date Time  Temp Pulse Resp B/P Pulse Ox O2 Delivery O2 Flow Rate FiO2


 


7/18/17 08:56    109/57    


 


7/18/17 08:55  74  109/57    


 


7/18/17 08:50 97.7 70 20 109/59 96 Room Air  


 


7/18/17 08:30      Room Air  


 


7/18/17 08:30  75 18   Room Air  21 7/18/17 08:30      Room Air  21


 


7/18/17 04:35 98.2 72 20 118/62 94 Room Air  


 


7/18/17 01:41 98.2       


 


7/18/17 00:57 98.2 72 20 110/46 94 Room Air  


 


7/17/17 21:00 98.1 68 20 140/74 94 Room Air  


 


7/17/17 20:07      Room Air  21 7/17/17 20:07  78 18   Room Air  21 7/17/17 20:06      Room Air  


 


7/17/17 16:14 98.1 65 18 123/76 96 Room Air  

















Intake and Output  


 


 7/17/17 7/18/17





 19:00 07:00


 


Intake Total 1186.071 ml 826.071 ml


 


Balance 1186.071 ml 826.071 ml


 


  


 


Intake Oral 840 ml 480 ml


 


IV Total 346.071 ml 346.071 ml


 


# Voids 3 2


 


# Bowel Movements 1 








Laboratory Tests


7/18/17 04:15: 


Prothrombin Time 10.3, Prothromb Time International Ratio 1.0, Activated 

Partial Thromboplast Time 65H


Height (Feet):  5


Height (Inches):  5.00


Weight (Pounds):  204


Objective


Cv RR


Lungs CTA


Abd SNT .BS +


E no CCE











SINDI JIMENEZ Jul 18, 2017 12:55

## 2017-07-19 VITALS — DIASTOLIC BLOOD PRESSURE: 73 MMHG | SYSTOLIC BLOOD PRESSURE: 142 MMHG

## 2017-07-19 VITALS — SYSTOLIC BLOOD PRESSURE: 141 MMHG | DIASTOLIC BLOOD PRESSURE: 76 MMHG

## 2017-07-19 VITALS — DIASTOLIC BLOOD PRESSURE: 60 MMHG | SYSTOLIC BLOOD PRESSURE: 139 MMHG

## 2017-07-19 VITALS — SYSTOLIC BLOOD PRESSURE: 129 MMHG | DIASTOLIC BLOOD PRESSURE: 76 MMHG

## 2017-07-19 VITALS — SYSTOLIC BLOOD PRESSURE: 133 MMHG | DIASTOLIC BLOOD PRESSURE: 83 MMHG

## 2017-07-19 LAB
INR PPP: 1.3 (ref 0.9–1.1)
PROTHROMBIN TIME: 13.5 SEC (ref 9.3–11.5)

## 2017-07-19 RX ADMIN — METFORMIN HYDROCHLORIDE SCH MG: 500 TABLET ORAL at 08:36

## 2017-07-19 RX ADMIN — HYDROCODONE BITARTRATE AND ACETAMINOPHEN PRN EA: 10; 325 TABLET ORAL at 08:42

## 2017-07-19 RX ADMIN — HYDROCODONE BITARTRATE AND ACETAMINOPHEN PRN EA: 10; 325 TABLET ORAL at 01:53

## 2017-07-19 RX ADMIN — HYDROCODONE BITARTRATE AND ACETAMINOPHEN PRN EA: 10; 325 TABLET ORAL at 13:09

## 2017-07-19 RX ADMIN — INSULIN ASPART SCH UNITS: 100 INJECTION, SOLUTION INTRAVENOUS; SUBCUTANEOUS at 11:30

## 2017-07-19 RX ADMIN — ASPIRIN 81 MG SCH MG: 81 TABLET ORAL at 08:35

## 2017-07-19 RX ADMIN — LISINOPRIL SCH MG: 2.5 TABLET ORAL at 08:37

## 2017-07-19 RX ADMIN — METFORMIN HYDROCHLORIDE SCH MG: 500 TABLET ORAL at 17:13

## 2017-07-19 RX ADMIN — CALCITRIOL SCH MCG: 0.25 CAPSULE, LIQUID FILLED ORAL at 08:36

## 2017-07-19 RX ADMIN — FLUTICASONE PROPIONATE AND SALMETEROL SCH PUFFS: 50; 250 POWDER RESPIRATORY (INHALATION) at 09:00

## 2017-07-19 RX ADMIN — INSULIN ASPART SCH UNITS: 100 INJECTION, SOLUTION INTRAVENOUS; SUBCUTANEOUS at 21:42

## 2017-07-19 RX ADMIN — ALPRAZOLAM SCH MG: 0.5 TABLET ORAL at 21:35

## 2017-07-19 RX ADMIN — INSULIN ASPART SCH UNITS: 100 INJECTION, SOLUTION INTRAVENOUS; SUBCUTANEOUS at 17:14

## 2017-07-19 RX ADMIN — FLUTICASONE PROPIONATE AND SALMETEROL SCH PUFFS: 50; 250 POWDER RESPIRATORY (INHALATION) at 19:50

## 2017-07-19 RX ADMIN — HYDROCODONE BITARTRATE AND ACETAMINOPHEN PRN EA: 10; 325 TABLET ORAL at 21:38

## 2017-07-19 RX ADMIN — POLYETHYLENE GLYCOL 3350 PRN GM: 17 POWDER, FOR SOLUTION ORAL at 03:24

## 2017-07-19 RX ADMIN — HEPARIN SODIUM SCH MLS/HR: 5000 INJECTION, SOLUTION INTRAVENOUS at 13:03

## 2017-07-19 RX ADMIN — INSULIN ASPART SCH UNITS: 100 INJECTION, SOLUTION INTRAVENOUS; SUBCUTANEOUS at 05:56

## 2017-07-19 NOTE — GENERAL PROGRESS NOTE
Assessment/Plan


Assessment/Plan


Rt. Leg DVT. INR Subtherapeutic. Today INR 1.3 going up but still low. On 

heparin Drip. Overriding pharmacy Coumadinization to hasten process.





Subjective


Allergies:  


Coded Allergies:  


     DIAZEPAM (Verified  Allergy, Mild, Itching, 12/13/15)


     DOBUTAMINE (Verified  Allergy, Unknown, 12/13/15)


     MORPHINE (Verified  Allergy, Unknown, 6/4/17)


     SOLIFENACIN (Verified  Allergy, Unknown, 6/4/17)


Subjective


No c/o





Objective





Last 24 Hour Vital Signs








  Date Time  Temp Pulse Resp B/P Pulse Ox O2 Delivery O2 Flow Rate FiO2


 


7/19/17 12:00 98.1 72 20 133/83 97 Room Air  


 


7/19/17 08:37    129/76    


 


7/19/17 08:36  76  129/76    


 


7/19/17 08:07 97.3 76 20 129/76 98 Room Air  


 


7/19/17 07:42      Room Air  


 


7/19/17 07:40      Room Air  


 


7/19/17 03:57 97.7 75 18 141/76 95 Room Air  


 


7/19/17 03:03 97.3       


 


7/18/17 23:48 97.3 76 19 148/78 94 Room Air  


 


7/18/17 20:00 97.7 80 18 161/78 95 Room Air  


 


7/18/17 19:38      Room Air  21


 


7/18/17 19:38  72 18   Room Air  21


 


7/18/17 19:37      Room Air  


 


7/18/17 16:27 97.0 72 20 145/70 95 Room Air  


 


7/18/17 12:49 98.1 59 20 132/63 95 Room Air  

















Intake and Output  


 


 7/18/17 7/19/17





 19:00 07:00


 


Intake Total 946.071 ml 676.071 ml


 


Balance 946.071 ml 676.071 ml


 


  


 


Intake Oral 600 ml 360 ml


 


IV Total 346.071 ml 316.071 ml


 


# Voids 2 4








Laboratory Tests


7/19/17 04:15: Activated Partial Thromboplast Time 84H


7/19/17 11:05: 


Prothrombin Time 13.5H, Prothromb Time International Ratio 1.3H


Height (Feet):  5


Height (Inches):  5.00


Weight (Pounds):  204


Objective


Cv RR


Lungs CTA


Abd SNT .BS +


E no CCE











SINDI JIMENEZ Jul 19, 2017 12:26

## 2017-07-20 VITALS — SYSTOLIC BLOOD PRESSURE: 123 MMHG | DIASTOLIC BLOOD PRESSURE: 68 MMHG

## 2017-07-20 VITALS — DIASTOLIC BLOOD PRESSURE: 55 MMHG | SYSTOLIC BLOOD PRESSURE: 120 MMHG

## 2017-07-20 VITALS — DIASTOLIC BLOOD PRESSURE: 68 MMHG | SYSTOLIC BLOOD PRESSURE: 127 MMHG

## 2017-07-20 VITALS — DIASTOLIC BLOOD PRESSURE: 66 MMHG | SYSTOLIC BLOOD PRESSURE: 124 MMHG

## 2017-07-20 VITALS — DIASTOLIC BLOOD PRESSURE: 73 MMHG | SYSTOLIC BLOOD PRESSURE: 101 MMHG

## 2017-07-20 VITALS — SYSTOLIC BLOOD PRESSURE: 158 MMHG | DIASTOLIC BLOOD PRESSURE: 75 MMHG

## 2017-07-20 LAB
APTT BLD: 107 SEC (ref 23–33)
INR PPP: 1.7 (ref 0.9–1.1)
PROTHROMBIN TIME: 17.8 SEC (ref 9.3–11.5)

## 2017-07-20 RX ADMIN — CALCITRIOL SCH MCG: 0.25 CAPSULE, LIQUID FILLED ORAL at 10:01

## 2017-07-20 RX ADMIN — FLUTICASONE PROPIONATE AND SALMETEROL SCH PUFFS: 50; 250 POWDER RESPIRATORY (INHALATION) at 20:14

## 2017-07-20 RX ADMIN — ALPRAZOLAM SCH MG: 0.5 TABLET ORAL at 20:58

## 2017-07-20 RX ADMIN — HEPARIN SODIUM SCH MLS/HR: 5000 INJECTION, SOLUTION INTRAVENOUS at 14:18

## 2017-07-20 RX ADMIN — ASPIRIN 81 MG SCH MG: 81 TABLET ORAL at 10:01

## 2017-07-20 RX ADMIN — HYDROCODONE BITARTRATE AND ACETAMINOPHEN PRN EA: 10; 325 TABLET ORAL at 12:45

## 2017-07-20 RX ADMIN — METFORMIN HYDROCHLORIDE SCH MG: 500 TABLET ORAL at 17:08

## 2017-07-20 RX ADMIN — FLUTICASONE PROPIONATE AND SALMETEROL SCH PUFFS: 50; 250 POWDER RESPIRATORY (INHALATION) at 10:00

## 2017-07-20 RX ADMIN — HYDROCODONE BITARTRATE AND ACETAMINOPHEN PRN EA: 10; 325 TABLET ORAL at 17:08

## 2017-07-20 RX ADMIN — HEPARIN SODIUM SCH MLS/HR: 5000 INJECTION, SOLUTION INTRAVENOUS at 06:07

## 2017-07-20 RX ADMIN — LISINOPRIL SCH MG: 2.5 TABLET ORAL at 10:02

## 2017-07-20 RX ADMIN — POLYETHYLENE GLYCOL 3350 PRN GM: 17 POWDER, FOR SOLUTION ORAL at 11:33

## 2017-07-20 RX ADMIN — HYDROCODONE BITARTRATE AND ACETAMINOPHEN PRN EA: 10; 325 TABLET ORAL at 20:58

## 2017-07-20 RX ADMIN — HYDROCODONE BITARTRATE AND ACETAMINOPHEN PRN EA: 10; 325 TABLET ORAL at 06:34

## 2017-07-20 RX ADMIN — METFORMIN HYDROCHLORIDE SCH MG: 500 TABLET ORAL at 10:01

## 2017-07-20 RX ADMIN — INSULIN ASPART SCH UNITS: 100 INJECTION, SOLUTION INTRAVENOUS; SUBCUTANEOUS at 16:59

## 2017-07-20 RX ADMIN — INSULIN ASPART SCH UNITS: 100 INJECTION, SOLUTION INTRAVENOUS; SUBCUTANEOUS at 11:35

## 2017-07-20 RX ADMIN — INSULIN ASPART SCH UNITS: 100 INJECTION, SOLUTION INTRAVENOUS; SUBCUTANEOUS at 21:00

## 2017-07-20 RX ADMIN — INSULIN ASPART SCH UNITS: 100 INJECTION, SOLUTION INTRAVENOUS; SUBCUTANEOUS at 06:12

## 2017-07-20 NOTE — GENERAL PROGRESS NOTE
Assessment/Plan


Assessment/Plan


Rt. Leg DVT. INR Subtherapeutic. Today INR 1.7 going up but still low. On 

heparin Drip. Overriding pharmacy Coumadinization to hasten process.





Subjective


Allergies:  


Coded Allergies:  


     DIAZEPAM (Verified  Allergy, Mild, Itching, 12/13/15)


     DOBUTAMINE (Verified  Allergy, Unknown, 12/13/15)


     MORPHINE (Verified  Allergy, Unknown, 6/4/17)


     SOLIFENACIN (Verified  Allergy, Unknown, 6/4/17)


Subjective


No c/o





Objective





Last 24 Hour Vital Signs








  Date Time  Temp Pulse Resp B/P Pulse Ox O2 Delivery O2 Flow Rate FiO2


 


7/20/17 11:55 97.9 62 19 123/68 96 Room Air  


 


7/20/17 10:03  67  121/63    


 


7/20/17 10:02    121/63    


 


7/20/17 08:06 97.0 136 25 101/73 97 Room Air  


 


7/20/17 04:45 97.2 61 20 124/66 97 Room Air  


 


7/20/17 00:36 97.5 68 18 127/68 94 Room Air  


 


7/19/17 20:56 97.7 72 19 142/73 96 Room Air  


 


7/19/17 19:50      Room Air  


 


7/19/17 19:49      Room Air  


 


7/19/17 15:56 97.7 70 20 139/60 98 Room Air  

















Intake and Output  


 


 7/19/17 7/20/17





 19:00 07:00


 


Intake Total 1514.610 ml 346.071 ml


 


Balance 1514.610 ml 346.071 ml


 


  


 


Intake Oral 1200 ml 


 


IV Total 314.610 ml 346.071 ml


 


# Voids 4 4


 


# Bowel Movements 1 3








Laboratory Tests


7/20/17 04:30: 


Prothrombin Time 17.8H, Prothromb Time International Ratio 1.7H, Activated 

Partial Thromboplast Time 107H


7/20/17 11:52: Activated Partial Thromboplast Time 59H


Height (Feet):  5


Height (Inches):  5.00


Weight (Pounds):  204


Objective


Cv RR


Lungs CTA


Abd SNT .BS +


E no CCE











SINDI JIMENEZ Jul 20, 2017 13:23

## 2017-07-21 VITALS — SYSTOLIC BLOOD PRESSURE: 135 MMHG | DIASTOLIC BLOOD PRESSURE: 72 MMHG

## 2017-07-21 VITALS — DIASTOLIC BLOOD PRESSURE: 73 MMHG | SYSTOLIC BLOOD PRESSURE: 121 MMHG

## 2017-07-21 VITALS — DIASTOLIC BLOOD PRESSURE: 76 MMHG | SYSTOLIC BLOOD PRESSURE: 144 MMHG

## 2017-07-21 VITALS — DIASTOLIC BLOOD PRESSURE: 62 MMHG | SYSTOLIC BLOOD PRESSURE: 155 MMHG

## 2017-07-21 LAB
APTT BLD: 55 SEC (ref 23–33)
INR PPP: 2.3 (ref 0.9–1.1)
PROTHROMBIN TIME: 23.9 SEC (ref 9.3–11.5)

## 2017-07-21 RX ADMIN — LISINOPRIL SCH MG: 2.5 TABLET ORAL at 08:23

## 2017-07-21 RX ADMIN — INSULIN ASPART SCH UNITS: 100 INJECTION, SOLUTION INTRAVENOUS; SUBCUTANEOUS at 11:39

## 2017-07-21 RX ADMIN — ASPIRIN 81 MG SCH MG: 81 TABLET ORAL at 08:21

## 2017-07-21 RX ADMIN — HYDROCODONE BITARTRATE AND ACETAMINOPHEN PRN EA: 10; 325 TABLET ORAL at 08:34

## 2017-07-21 RX ADMIN — METFORMIN HYDROCHLORIDE SCH MG: 500 TABLET ORAL at 08:21

## 2017-07-21 RX ADMIN — INSULIN ASPART SCH UNITS: 100 INJECTION, SOLUTION INTRAVENOUS; SUBCUTANEOUS at 06:07

## 2017-07-21 RX ADMIN — FLUTICASONE PROPIONATE AND SALMETEROL SCH PUFFS: 50; 250 POWDER RESPIRATORY (INHALATION) at 09:03

## 2017-07-21 RX ADMIN — CALCITRIOL SCH MCG: 0.25 CAPSULE, LIQUID FILLED ORAL at 08:23

## 2017-07-21 NOTE — GENERAL PROGRESS NOTE
Assessment/Plan


Assessment/Plan


Rt. Leg DVT. INR 2.3


DC home





Subjective


Allergies:  


Coded Allergies:  


     DIAZEPAM (Verified  Allergy, Mild, Itching, 12/13/15)


     DOBUTAMINE (Verified  Allergy, Unknown, 12/13/15)


     MORPHINE (Verified  Allergy, Unknown, 6/4/17)


     SOLIFENACIN (Verified  Allergy, Unknown, 6/4/17)


Subjective


No c/o





Objective





Last 24 Hour Vital Signs








  Date Time  Temp Pulse Resp B/P Pulse Ox O2 Delivery O2 Flow Rate FiO2


 


7/21/17 09:04      Room Air  


 


7/21/17 09:03      Room Air  


 


7/21/17 08:25 97.2 72 20 144/76 98 Room Air  


 


7/21/17 08:24  72  144/72    


 


7/21/17 08:23    144/72    


 


7/21/17 04:22 96.8 60 17 135/72 96 Room Air  


 


7/21/17 00:15 97.7 61 18 155/62 97 Room Air  


 


7/20/17 22:07 97.7       


 


7/20/17 20:14      Room Air  


 


7/20/17 20:14      Room Air  


 


7/20/17 20:06 97.7 68 20 158/75 98 Room Air  


 


7/20/17 16:00 97.2 66 20 120/55 97 Room Air  


 


7/20/17 11:55 97.9 62 19 123/68 96 Room Air  

















Intake and Output  


 


 7/20/17 7/21/17





 19:00 07:00


 


Intake Total 389.611 ml 590.673 ml


 


Balance 389.611 ml 590.673 ml


 


  


 


Intake Oral 360 ml 300 ml


 


IV Total 29.611 ml 290.673 ml


 


# Voids  7








Laboratory Tests


7/20/17 11:52: Activated Partial Thromboplast Time 59H


7/20/17 20:25: Activated Partial Thromboplast Time 112H


7/21/17 04:40: 


Activated Partial Thromboplast Time 55H, Prothrombin Time 23.9H, Prothromb Time 

International Ratio 2.3H


Height (Feet):  5


Height (Inches):  5.00


Weight (Pounds):  204


Objective


Cv RR


Lungs CTA


Abd SNT .BS +


E no CCE











SINDI JIMENEZ Jul 21, 2017 10:10

## 2017-07-24 NOTE — DISCHARGE SUMMARY 2 SIG
DATE OF ADMISSION:  07/14/2017



DATE OF DISCHARGE:  07/21/2017



BRIEF HOSPITAL COURSE:  This is a 66-year-old female with history of

hypertension, diabetes, and COPD, presented to emergency room with right

lower extremity pain and edema for few days.  The patient admitted to

recent fall and pain started few days after that.  The patient also

admitted to hitting her head.  In the emergency department, CT of the head

revealed no acute intracranial pathology, but demonstrated mild

age-related changes.  X-ray of the right ankle revealed no evidence of

acute fracture or dislocation.  Venous duplex, right lower extremity

revealed acute DVT, peroneal and anterior tibial vein.  The patient

started on anticoagulation with heparin and Coumadin to bridge to

therapeutic INR.  Therapeutic INR only was achieved on 07/21/2017, INR

2.3, heparin discontinued and the patient was discharged on the Coumadin

and follow up with medical doctor as outlined for INR checking.

Supplemental oxygen and pulmonary toilet provided as needed.  Pulse

oximetry was stable on room air.  Blood sugar was managed with metformin

and was stable.  Blood pressure was managed with beta-blocker and was

stable.  Bowel regimen instituted.  Pain management provided.  Electrolyte

imbalances were corrected such as hypokalemia and hypomagnesemia, stable

prior to discharge.  Noted elevated TSH, however, free uptake of T3 within

normal limits.  Recommended to check thyroid function tests in two to

three months.  The patient was stable for discharge.



DISCHARGE DIAGNOSES:  Include:



1. Acute deep vein thrombosis, right lower extremity, peroneal and

anterior tibial vein.

2. Status post mechanical fall.

3. Electrolyte imbalances (hypokalemia and hypomagnesemia), resolved.

4. Hypertension.

5. Diabetes.

6. Chronic obstructive pulmonary disease.



DISCHARGE MEDICATIONS:  See medication reconciliation list.



DISCHARGE INSTRUCTIONS:  The patient discharged home.  Follow up with

the medical doctor for INR management.







  ______________________________________________

  Gale Velasquez M.D.



I have been assigned to dictate discharge summary on this account and I

was not involved in the patient's management.



  ______________________________________________

  Ava HernandezSt. Peter[facility] HospitalLEONIDAS Murphy





DR:  Myriam

D:  07/24/2017 10:47

T:  07/24/2017 23:34

JOB#:  [id number]

CC:

## 2017-07-24 NOTE — DISCHARGE SUMMARY
Discharge Summary


Hospital Course


Date of Admission


Jul 14, 2017 at 14:27


Date of Discharge


Jul 21, 2017 at 14:19


Admitting Diagnosis


deep vein thrombosis


HPI


Alex Soliman is a 66 year old female who was admitted on Jul 14, 2017 at 14:

27 for Deep Vein Thrombosis


Hospital Course


dc summary #[id number]





Discharge Medications


Continued Medications:  


Alprazolam* (Xanax*) 0.5 Mg Tablet


0.5 MG ORAL BEDTIME, TAB





Apremilast (Otezla) 30 Mg Tablet


30 MG PO BID, TAB





Aspirin* (Aspir 81*) 81 Mg Tablet.dr


325 MG ORAL DAILY for 30 Days, TAB





Atorvastatin Calcium* (Lipitor*) 40 Mg Tablet


40 MG ORAL QHS, TAB





Baclofen* (Baclofen*) 10 Mg Tablet


10 MG ORAL PRN for Muscle Spasm, TAB





Budesonide (Budesonide) 0.5 Mg/2 Ml Ampul.neb


0.5 MG IH BID, EA





Calcitriol (Calcitriol) 0.25 Mcg Capsule


0.25 MCG PO DAILY, CAP





Clotrimazole/Betamethasone Dip (Clotrimazole-Betamethasone Crm) 15 Gm Cream..g.


15 GM TP DAILY PRN for PRN, GM





Diphenhydramine Hcl* (Diphenhydramine Hcl*) 25 Mg Capsule


25 MG ORAL HS, CAP 0 Refills





Formoterol Fumarate (Perforomist) 20 Mcg/2 Ml Vial.neb


20 MCG IH BID, VIAL





Gabapentin* (Gabapentin*) 300 Mg Capsule


300 MG ORAL THREE TIMES A DAY, CAP 0 Refills





Hydrochlorothiazide* (Hydrochlorothiazide*) 25 Mg Tablet


25 MG ORAL DAILY, TAB





Hydrocodone/Acetaminophen (Hydrocodon-Acetaminophn ) 1 Ea Tab


1 TAB ORAL Q4H PRN for For Pain, TAB 0 Refills





Hydrocortisone (Hydrocortisone Cream 2.5%) Y Cream.appl


1 APPLIC TP PRN for prn, GM





Lidocaine (Lidocaine) 1 Each Adh..patch


Unknown Dose TDERMAL BID, PATCH





Lisinopril (Lisinopril*) 5 Mg Tablet


5 MG ORAL DAILY, TAB





Metformin Hcl* (Metformin Hcl*) 1,000 Mg Tablet


1000 MG ORAL BID, TAB





Metoprolol Succinate* (Metoprolol Succinate*) 25 Mg Tab.er.24h


25 MG ORAL DAILY, TAB





Naproxen* (Naproxen*) 500 Mg Tablet


500 MG ORAL TWICE A DAY PRN for For Pain, TAB





Omeprazole (Omeprazole) 20 Mg Capsule.dr


20 MG ORAL DAILY, CAP





Primidone* (Mysoline*) 50 Mg Tablet


50 MG PO QHS, TAB





Solifenacin Succinate* (Vesicare*) 5 Mg Tablet


5 MG ORAL DAILY, TAB





Warfarin Sod* (Coumadin*) 5 Mg Tablet


5 MG ORAL DAILY, TAB











Discharge


Condition Upon Discharge:  stable


Discharge Disposition


Patient was discharged to Home (01)


Discharge Diagnoses:  





Discharge Instructions


Discharge Instructions


Special Instructions


I have been assigned to complete a D/C Summary on this account. I was not 

involved in the patient management











Ava De Leon NP (Vanchtein) Jul 24, 2017 10:48

## 2018-03-12 ENCOUNTER — HOSPITAL ENCOUNTER (OUTPATIENT)
Dept: HOSPITAL 72 - RAD | Age: 68
Discharge: HOME | End: 2018-03-12
Payer: MEDICARE

## 2018-03-12 DIAGNOSIS — R05: Primary | ICD-10-CM

## 2018-03-12 PROCEDURE — 71046 X-RAY EXAM CHEST 2 VIEWS: CPT

## 2018-03-12 NOTE — DIAGNOSTIC IMAGING REPORT
Indication: Cough

 

Technique: 2 views of the chest

 

Comparison: 3/16/2017

 

Findings: Lungs and pleural spaces are clear. Previously demonstrated left basilar

atelectatic changes are no longer evident. The heart is borderline enlarged

 

Impression: No acute process

 

Borderline cardiomegaly

## 2018-05-21 ENCOUNTER — HOSPITAL ENCOUNTER (OUTPATIENT)
Dept: HOSPITAL 72 - CAT | Age: 68
Discharge: HOME | End: 2018-05-21
Payer: MEDICARE

## 2018-05-21 DIAGNOSIS — R10.9: Primary | ICD-10-CM

## 2018-05-21 DIAGNOSIS — Z96.642: ICD-10-CM

## 2018-05-21 DIAGNOSIS — N13.2: ICD-10-CM

## 2018-05-21 DIAGNOSIS — Z90.49: ICD-10-CM

## 2018-05-21 PROCEDURE — 74176 CT ABD & PELVIS W/O CONTRAST: CPT

## 2018-05-21 NOTE — DIAGNOSTIC IMAGING REPORT
Indication: History of renal stones and flank pain

 

Technique: Continuous helical transaxial imaging of the abdomen and pelvis was

obtained from the lung bases to the pubic symphysis. No intravenous contrast was

administered. Coronal 2-D reformats were also obtained. Automatic Exposure Control

was utilized.

 

 

Total Dose length Product (DLP):  940 mGycm

 

CT Dose Index Volume (CTDIvol):   0.15, 18.6 mGy

 

Comparison: 6/4/2017

 

Findings: Previously demonstrated obstruction involving the left kidney has a

resolved. Hydronephrosis no longer visualized. Once again there are multiple stones

at the level of the kidneys bilaterally. There is one new stone in the midpole calyx

of the left kidney measuring about 5 mm. This was not seen on the prior occasion.

Several tiny nonobstructive stones again noted within the right kidney. No

hydronephrosis seen on either side. The left ureter is not visualized well distally

due to streak artifact from a left hip prosthesis. The right ureter is mostly seen.

The distal segment of the left ureter is not seen. Bladder is grossly unremarkable in

appearance. Uterus noted. Scar in the area of the umbilicus noted indicative of

previous surgery. Cholecystectomy noted.

 

Within the lateral segment of the left lobe of the liver there is an unusual large

lobulated area of hypodensity slightly heterogeneous in appearance measuring

approximately 5 x 7.7 x 6.8 cm not previously seen. Hounsfield units sampling

suggests some areas of fat density. Geographic fatty infiltration as one possibility.

Sonographic correlation recommended. Further evaluation of this also recommended with

dynamic contrast-enhanced CT.

 

The right femoral head is sclerotic. AVN suspected.

 

IMPRESSION:

 

Interval development of a large heterogeneous hypodensity in the left lobe of the

liver measuring 5 x 7.7 x 6.8 cm. Differential considerations include a liver

infarct, geographic fatty infiltration and malignancy. Contrast-enhanced dynamic CT

of the liver is recommended. Sonographic correlation is also suggested.

 

Interval resolution of left hydronephrosis and obstruction. No hydronephrosis

currently demonstrated. Multiple bilateral nonobstructive stones.

 

Status post cholecystectomy.

 

Obscure the left distal ureter due to streak artifact from a hip prosthesis.

 

Sclerosis of the right femoral head. AVN suspected.

 

 

The CT scanner at [facility] is accredited by the American College of

Radiology and the scans are performed using dose optimization techniques as

appropriate to a performed exam including Automatic Exposure control.

## 2018-06-07 ENCOUNTER — HOSPITAL ENCOUNTER (OUTPATIENT)
Dept: HOSPITAL 72 - CAT | Age: 68
Discharge: HOME | End: 2018-06-07
Payer: MEDICARE

## 2018-06-07 DIAGNOSIS — Z90.49: ICD-10-CM

## 2018-06-07 DIAGNOSIS — N20.0: Primary | ICD-10-CM

## 2018-06-07 PROCEDURE — 74176 CT ABD & PELVIS W/O CONTRAST: CPT

## 2018-06-07 NOTE — DIAGNOSTIC IMAGING REPORT
Indication: Hematuria, kidney stone

 

Technique: Spiral acquisitions obtained through the abdomen and pelvis. No oral

contrast utilized, per emergency room physician request No IV contrast utilized,  per

referring physician request.. Multiplanar reconstructions were generated. Total dose

length product 921.96 mGycm. CTDIvol(s) 18.4 mGy. Dose reduction achieved using

automated exposure control

 

 

Comparison: 5/21/2018

 

Findings: 3 calculi are seen in the right kidney, appearing unchanged in size and

position. Largest of these measures approximately 3 mm diameter. A single 4 mm

calculus is seen in the left kidney, also evident previously. Subtle slight increased

density in the left lower pole collecting system could represent a lower pole

calyceal calculus, not evident previously if real. No ureteral calculi, although the

left ureter is not well-visualized due to streak artifact from a hip prosthesis.. No

hydronephrosis nor hydroureter. Lack of IV contrast limits assessment of the renal

parenchyma. No gross renal parenchymal mass or cyst demonstrated. The bladder is

grossly unremarkable and no bladder calculi are evident

 

Lack of IV contrast limits assessment of the other solid organs. Within the left lobe

of the liver, there is a somewhat unusual appearing area of low attenuation which

measures 7.7 cm AP by 5 cm transverse by 4 cm craniocaudad. This appears unchanged

from the previous exam. No new liver lesions are demonstrated. There are

cholecystectomy clips. No biliary ductal dilatation. The pancreas, spleen, adrenals

are unremarkable. No retroperitoneal or mesenteric mass or adenopathy. No pelvic mass

or adenopathy. There is a left hip prosthesis. This throws off streak artifact which

could obscure pathology in the pelvis. The uterus appears grossly unremarkable.

 

No evidence of colonic diverticulosis. No evidence of diverticulitis. The appendix is

not definitely evident, but no findings to suggest acute appendicitis are

demonstrated. No small bowel distention. No free or loculated intraperitoneal air or

fluid. There is a midline incisional scar. The distal esophagus, stomach, duodenum

are all unremarkable.

 

The included lung bases are clear. Pericardial thickening is again demonstrated and

unchanged. Again demonstrated are sclerotic and cystic changes of the right femoral

head, appearing unchanged.

 

Impression: Nonobstructive bilateral intrarenal calculi are again demonstrated,

unchanged from prior study of 5/21/2018

 

Negative for evidence of hydronephrosis or ureteral calculus

 

Low-attenuation area in the left hepatic lobe, as described, unchanged from previous

study. As previously reported, the could represent an area of geographic fatty

infiltration. However, mass or abscess are also possibilities. Further workup with

liver protocol contrast MRI is recommended if this has not been previously worked up

 

Sclerotic and cystic changes of the right femoral head. Most likely on the basis of

degenerative change, but could represent early changes of avascular necrosis. Also

previously reported

 

Other findings as noted, including stable pericardial thickening, evidence of prior

midline incision, evidence of prior cholecystectomy

 

The CT scanner at [facility] is accredited by the American College of

Radiology and the scans are performed using protocols designed to limit radiation

exposure to as low as reasonably achievable to attain images of sufficient resolution

adequate for diagnostic evaluation.

## 2018-07-26 ENCOUNTER — HOSPITAL ENCOUNTER (OUTPATIENT)
Dept: HOSPITAL 72 - ULS | Age: 68
Discharge: HOME | End: 2018-07-26
Payer: MEDICARE

## 2018-07-26 DIAGNOSIS — R10.9: Primary | ICD-10-CM

## 2018-07-26 DIAGNOSIS — N20.0: ICD-10-CM

## 2018-07-26 DIAGNOSIS — Z90.49: ICD-10-CM

## 2018-07-26 PROCEDURE — 76700 US EXAM ABDOM COMPLETE: CPT

## 2018-07-26 NOTE — DIAGNOSTIC IMAGING REPORT
Indication: Abdominal pain, evaluation of abnormality in the liver seen on prior

abdomen pelvis CT

 

Technique: Gray-scale and duplex images of the upper abdomen were obtained

 

Comparison: Reference made to abdomen pelvis CT dated 6/7/2018

 

Findings: Gallbladder is surgically absent. Common bile duct measures 10 mm in

diameter.  No intrahepatic biliary ductal dilatation.  Liver demonstrates diffusely

increased echogenicity. In the left hepatic lobe, in the area of previously

demonstrated, on CT, abnormality, there is questionably a 5 x 3.4 cm area of slightly

decreased echogenicity.   Portal vein and hepatic veins are patent.   Pancreas is

unremarkable.    Spleen is unremarkable.  Left kidney measures 11.7 cm in length. 

Right kidney measures 12.5 cm length.  Both kidneys demonstrate normal echogenicity. 

There is no hydronephrosis.   Calcifications in the left renal sinus are also

demonstrated on prior CT scan. Small left renal cysts are demonstrated, not clearly

evident on prior CT. No focal right renal abnormality . Abdominal aorta is partially

obscured by bowel gas, visualized portions are non-aneurysmal .

 

Impression: Liver demonstrates diffusely increased echogenicity, consistent with

diffuse hepatocellular disease, most likely fatty change.

 

Subtle area of slightly decreased echogenicity in the posterior left lower lobe, if

real corresponding to abnormality demonstrated on recent CT scan. Sonographic

findings do not add any specificity to the CT finding. Consider further evaluation

with liver protocol contrast CT for better characterization

 

Nonobstructive left renal calculi, also described on recent CT

 

Surgically absent gallbladder. Mildly dilated extra hepatic bile ducts, likely

related to age and postcholecystectomy state, downstream obstruction not completely

excludable. Correlate with liver function tests

 

Incidental finding left renal cysts

 

Note nonvisualization of the distal abdominal aorta

## 2018-09-22 ENCOUNTER — HOSPITAL ENCOUNTER (EMERGENCY)
Dept: HOSPITAL 72 - EMR | Age: 68
Discharge: HOME | End: 2018-09-22
Payer: MEDICARE

## 2018-09-22 VITALS — BODY MASS INDEX: 37.9 KG/M2 | HEIGHT: 64 IN | WEIGHT: 222 LBS

## 2018-09-22 VITALS — SYSTOLIC BLOOD PRESSURE: 159 MMHG | DIASTOLIC BLOOD PRESSURE: 80 MMHG

## 2018-09-22 DIAGNOSIS — E11.42: ICD-10-CM

## 2018-09-22 DIAGNOSIS — Z79.84: ICD-10-CM
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
## 2018-09-22 DIAGNOSIS — J45.909: ICD-10-CM

## 2018-09-22 DIAGNOSIS — S90.121A: Primary | ICD-10-CM
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
## 2018-09-22 DIAGNOSIS — I10: ICD-10-CM
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
## 2018-09-22 DIAGNOSIS — K21.9: ICD-10-CM

## 2018-09-22 PROCEDURE — 99283 EMERGENCY DEPT VISIT LOW MDM: CPT

## 2018-09-22 NOTE — DIAGNOSTIC IMAGING REPORT
EXAM:

  XR Right Foot Complete, 3 or More Views

 

CLINICAL HISTORY:

  PAIN

 

TECHNIQUE:

  Frontal, lateral and oblique views of the right foot.

 

COMPARISON:

  No relevant prior studies available.

 

FINDINGS:

  Bones/joints:  No acute osseous abnormality.  Diffuse osteopenia.  No 

dislocation.

  Soft tissues:  Circumferential forefoot soft tissue prominence, no 

retained radiopaque foreign object.

 

IMPRESSION:     

1.  Circumferential forefoot soft tissue prominence, no retained 

radiopaque foreign object.

2.  No acute osseous abnormality.

3.  Diffuse osteopenia.

## 2018-09-22 NOTE — EMERGENCY ROOM REPORT
History of Present Illness


General


Chief Complaint:  Lower Extremity Injury


Source:  Patient





Present Illness


HPI


67-year-old female patient presents ER complaining of right small toe pain.  

States that she thinks she hurt it but does not remember exactly when.  Reports 

history of diabetes and peripheral neuropathy.  Reports taking diabetes 

medication, states "well controlled". reports pain in toe.  States that she 

took 2 Norco 10s, states that she has a prescription for them for degenerative 

disc disease and arthritis. reports pain and swelling in toe. Denies fever, 

chest pain, shortness of breath. reports able to walk, states normally walks 

with a walker, currently has a cane in the ER.


Allergies:  


Coded Allergies:  


     DIAZEPAM (Verified  Allergy, Mild, Itching, 12/13/15)


     DOBUTAMINE (Verified  Allergy, Unknown, 12/13/15)


     MORPHINE (Verified  Allergy, Unknown, 6/4/17)


     SOLIFENACIN (Verified  Allergy, Unknown, 6/4/17)





Patient History


Past Medical History:  see triage record


Last Menstrual Period:  NA


Reviewed Nursing Documentation:  PMH: Agreed; PSxH: Agreed





Nursing Documentation-PMH


Past Medical History:  No History, Except For


Hx Hypertension:  Yes


Hx Asthma:  Yes


Hx COPD:  Yes


Hx Diabetes:  Yes - type 2


Hx Cancer:  No


Hx Gastrointestinal Problems:  Yes - GERD


Hx Neurological Problems:  No





Review of Systems


All Other Systems:  negative except mentioned in HPI





Physical Exam





Vital Signs








  Date Time  Temp Pulse Resp B/P (MAP) Pulse Ox O2 Delivery O2 Flow Rate FiO2


 


9/22/18 16:50 98.3 79 18 159/80 94 Room Air  





 98.2       








Sp02 EP Interpretation:  reviewed, normal


General Appearance:  well appearing, no apparent distress, alert, GCS 15, non-

toxic


Head:  normocephalic, atraumatic


Eyes:  bilateral eye normal inspection, bilateral eye PERRL


ENT:  hearing grossly normal, normal pharynx, no angioedema, normal voice, 

uvula midline, moist mucus membranes


Neck:  full range of motion


Respiratory:  lungs clear, normal breath sounds, no rhonchi, no respiratory 

distress, no accessory muscle use, no wheezing, speaking full sentences


Cardiovascular #1:  regular rate, rhythm, no edema


Musculoskeletal:  back normal, digits/nails normal, gait/station normal, normal 

range of motion, non-tender, swelling - mild ecchymosis, no warmth to touch, 

other - NVI, nail bed is intact, no subungual hematoma, no nail bed avulsion, 

tender - right small toe


Neurologic:  alert, oriented x3, responsive, motor strength/tone normal, 

sensory intact


Skin:  no rash


Lymphatic:  no adenopathy





Medical Decision Making


PA Attestation


Dr. Kunz  is my supervising Physician whom patient management has been 

discussed with.


Diagnostic Impression:  


 Primary Impression:  


 Toe contusion


ER Course


Pt. presents to the ED c/o right small toe pain.





Ddx considered but are not limited to fracture, sprain, strain, contusion, 

dislocation.


No erythema, no warmth to touch, no fever, nontoxic appearing, low suspicion 

for septic joint.





Vital signs: are WNL, pt. is afebrile





Ordered X-ray and pain medication.





ER COURSE


Provided with pain medication.


An X-ray of the right foot shows no acute fracture per the preliminary reading. 

Likely contusion causing pain symptoms.


Discuss results with the patient.  


Provided patient with copy of results. 


Instructed patient to followup with PCP and discuss results of report with 

patient, discuss need for further treatment and referral.





Buddy tape was applied to the right small foot toe and foot placed in an open 

toe shoe and was checked afterwards by me showing good alignment and support 

with distal neurovascular functioning intact.





patient has cane to walk with, declined crutches..





Patient instructed on RICE method: rest, ice, compression, elevation.


Patient instructed on rest, ice and heat.


Patient instructed to be WBAT





Contact information for orthopedic urgent care provided, follow-up with urgent 

care if unable to followup with primary care provider and get referral to 

orthopedic specialist.


Followup with primary care provider. Discuss referral to ortho/pain management/

PT as needed. Discuss further imaging with MRI/CT as needed.





DISCHARGE:


-Rx provided for Tylenol for pain symptoms.





At this time pt. is stable for d/c to home. Patient is resting comfortably, in 

no acute distress, nontoxic appearing, talking without difficulty.


Will provide printed patient care instructions, and any necessary prescriptions.


Patient instructed to follow with primary care provider in 3 - 5 days and to 

request further follow-up as needed.


Care plan and follow up instructions have been discussed with the patient prior 

to discharge.


Take medications as directed. 


Patient questions asked and answered.


Patient reports understanding and agreement to treatment plan.


ER precautions given, patient instructed to return to ER immediately for any 

new or worsening of symptoms.





- Please note that this Emergency Department Report was dictated using [employer] technology software, occasionally this can lead to 

erroneous entry secondary to interpretation by the dictation equipment.


Other X-Ray Diagnostic Results


Other X-Ray Diagnostic Results :  


   X-Ray ordered:  right foot


   # of Views/Limited Vs Complete:  3 View


   Indication:  Pain


   EP Interpretation:  Yes


   PA Xray:  Interpretation reviewed, by supervising MD, and agrees with 

findings.


   Interpretation:  no dislocation, no soft tissue swelling, no fractures


   Impression:  No acute disease


   PA Scribe Text


Dylan Sequeira PA-C





Last Vital Signs








  Date Time  Temp Pulse Resp B/P (MAP) Pulse Ox O2 Delivery O2 Flow Rate FiO2


 


9/22/18 16:50 98.3 79 18 159/80 94 Room Air  





 98.2       








Disposition:  HOME, SELF-CARE


Condition:  Stable


Scripts


Acetaminophen* (TYLENOL EXTRA STRENGTH*) 500 Mg Tablet


500 MG ORAL Q8H PRN for Prn Headache/Temp > 101, #30 TAB 0 Refills


   Prov: Aftab Sequeira         9/22/18


Patient Instructions:  Turf Toe





Additional Instructions:  


Patient instructed to follow up with primary care provider and discuss further 

referral to orthopedics/physical therapy/pain management as needed. 


If unable to followup with PCP, followup with orthopedic urgent care in 5-7 days

, call to schedule appointment.


Patient instructed on RICE method: rest, ice, compression, elevation.


Patient instructed to WBAT.


Take medications as directed. 


Patient questions asked and answered.


ER precautions given, patient instructed to return to ER immediately for any 

new or worsening of symptoms.








[facility]


[id number]


[facility], [address]


[phone]


[email or web address]











Aftab Sequeira Sep 22, 2018 17:22
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
## 2019-03-28 NOTE — DIAGNOSTIC IMAGING REPORT
Indication: Back pain. Trauma

 

Technique: Continuous helical transaxial imaging of the lumbar spine was obtained

from the lung bases to the pubic symphysis.  No IV contrast was administered. 

Coronal 2-D reformats were also obtained. Study obtained in a Siemens sensation 64

slice CT. 

 

Total Dose length Product (DLP):  974.8 mGycm

 

CT Dose Index Volume (CTDIvol):   35.03 mGy

 

 

Comparison: None

 

Findings: There is no evidence of an acute fracture or malalignment. The bones are

osteopenic. There is degradation of image quality due to body habitus. Height and

configuration of the vertebral bodies and intervertebral discs are within normal

limits. Lower lumbar facet arthropathy is present. There is no soft tissue swelling.

 

 

Impression: No acute injury identified.

 

 

 

 

The CT scanner at [facility] is accredited by the American College of

Radiology and the scans are performed using dose optimization techniques as

appropriate to a performed exam including Automatic Exposure control.

## 2019-03-28 NOTE — DIAGNOSTIC IMAGING REPORT
Indication: Neck pain.

 

 

Technique: Continuous helical imaging of the cervical spine was obtained transaxially

from the skull base to the upper thoracic spine. 2-D coronal and sagittal reformatted

images were obtained. Automatic Exposure Control was utilized.

 

 

Total Dose length Product (DLP):  1605.49 mGycm

 

CT Dose Index Volume (CTDIvol):   70.38,15.77 mGy

 

Comparison: None

 

Findings: There is no acute fracture or malalignment identified. There is no soft

tissue swelling identified.

 

Moderate uncovertebral arthritis is demonstrated at multiple levels. Narrowing of the

neural foramina noted at multiple levels. Some of the intervertebral discs show

narrowing and osteophytes especially at C5-6 and C6-7. Bones are diffusely

osteopenic. Abnormal appearance of the left maxillary sinus which is partially

opacified demonstrated. Calcification of the extracranial carotid arteries noted.

 

Impression:  No acute injury

 

Moderate spondylosis as described above

 

Atherosclerotic disease

 

 

 

The CT scanner at [facility] is accredited by the American College of

Radiology and the scans are performed using dose optimization techniques as

appropriate to a performed exam including Automatic Exposure control.

## 2019-03-28 NOTE — DIAGNOSTIC IMAGING REPORT
Indication: Abdominal pain. Trauma

 

Technique: Continuous helical transaxial imaging of the abdomen and pelvis was

obtained from the lung bases to the pubic symphysis during intravenous contrast

administration. Coronal 2-D reformats were also obtained. Study obtained in a Siemens

sensation 64 slice CT.  Automatic Exposure Control was utilized.

 

Total Dose length Product (DLP):  938.7 mGycm

 

CT Dose Index Volume (CTDIvol):   18.84 mGy

 

Comparison: None

 

Findings: Minimal posterior basal atelectasis demonstrated. There is no pneumothorax

or effusion. The liver is diffusely hypodense consistent with fatty infiltration.

There is a small hiatal hernia. There is generalized image degradation due to body

habitus. Spleen is unremarkable. The pancreas is unremarkable. Cholecystectomy noted.

Major vascular structures in the abdomen enhance normally. There is no adrenal mass.

There is scarring in the lateral part of the right kidney which shows a focal divot

or volume loss. There are calcific densities within the right kidney consistent with

nonobstructive stones. Aortoiliac calcifications are present. There is a broad-based

umbilical hernia containing fat. There is no free fluid. There is no evidence of

bowel obstruction. A left total hip prosthesis is present. Subcutaneous air noted in

the right-sided abdominal wall. There is some heterogeneity of the uterus which may

be due to underlying fibroid. This is not evaluated well on the current exam. The

bladder is unremarkable. Appendix is not seen. The right femoral head is abnormally

sclerotic.

 

IMPRESSION:

 

There is no acute injury identified on this study.

 

Fatty liver.

 

Status post cholecystectomy.

 

Hiatal hernia.

 

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

 

Subcutaneous air anterior abdominal wall may be injection sites.

 

Heterogeneous uterus. Underlying fibroid may be present. This is not evaluated well.

 

Atherosclerotic disease.

 

Scarring in the right kidney

 

Nonobstructive tiny stones in the right kidney.

 

Left Total hip replacement.

 

 

 

 

 

The CT scanner at [facility] is accredited by the American College of

Radiology and the scans are performed using dose optimization techniques as

appropriate to a performed exam including Automatic Exposure control.
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
## 2019-03-28 NOTE — NUR
ER Nurse Note:



Pt seen, treated, medically cleared by MD for discharge. Discharge instructions 
and prescriptions given with repeat verberalization by pt. Instructed pt to 
follow up with primary care provider within one week. Pt a&ox4, VSS, no signs 
of distress. Called Karla, son, to  pt. ID band removed. IV removed, 
site clean and bandaged. Pt left with son and all belongings.

## 2019-03-28 NOTE — DIAGNOSTIC IMAGING REPORT
Indication: Headache

 

Technique: Contiguous 5 mm thick transaxial imaging of the head obtained in a Siemens

Sensation 64 slice CT scanner.  Soft tissue and bone windows generated.  Automatic

Exposure Control was utilized.

 

 

Total Dose length Product (DLP):  1605.49 mGycm

 

CT Dose Index Volume (CTDIvol):   70.38,15.77 mGy

 

Comparison: 7/14/2007

 

Findings: There is mild prominence of the ventricles, basal cisterns, and cerebral

sulci consistent with atrophy. Mild, nonspecific, white matter hypoattenuation is

noted throughout the brain consistent with chronic small vessel disease. 

 

There is no midline shift, edema, acute hemorrhage, mass effect, or abnormal

extra-axial fluid collections. Bones and extra osseous soft tissues are unremarkable.

There is no air-fluid level in the left maxillary sinus presumably due to sinusitis.

Correlate clinically.

 

Impression: No acute intracranial bleed, mass effect or edema.

 

Mild atrophy of the brain.

 

Nonspecific white matter hypoattenuation probably due to chronic small vessel

disease.

 

 

 

The CT scanner at [facility] is accredited by the American College of

Radiology and the scans are performed using dose optimization techniques as

appropriate to a performed exam including Automatic Exposure control.

## 2019-03-28 NOTE — DIAGNOSTIC IMAGING REPORT
Indication: Back pain and trauma

 

Technique: Continuous helical transaxial imaging of the thoracic spine was obtained

from the lung bases to the pubic symphysis.  No IV contrast was administered. 

Coronal 2-D reformats were also obtained. Study obtained in a Siemens sensation 64

slice CT. 

 

Total Dose length Product (DLP):  1301.63 mGycm

 

CT Dose Index Volume (CTDIvol):   40.13 mGy

 

 

Comparison: None

 

Findings: Bones are osteopenic. There is no acute fracture identified. No

malalignment appreciated. Vacuum phenomena and narrowing of some of the midthoracic

discs noted. No definite soft tissue swelling is identified. The thyroid gland is

heterogeneous, partially calcified with a small left lobe. No paravertebral or

paraspinous soft tissue swelling or fluid collections are identified. Aorta is

calcified. There are reticular densities within the lungs partially seen on this

examination, nonspecific in nature.

 

IMPRESSION:

 

No acute fracture or other signs of acute injury identified.

 

Degenerative changes of the thoracic spine as described above.

 

Reticular densities within the lungs nonspecific

 

Atherosclerotic disease

 

Thyroid disease.

 

Some breathing motion limits evaluation.

 

 

 

 

 

The CT scanner at [facility] is accredited by the American College of

Radiology and the scans are performed using dose optimization techniques as

appropriate to a performed exam including Automatic Exposure control.

## 2019-09-05 NOTE — GENERAL PROGRESS NOTE
Assessment/Plan


Assessment/Plan:


abd pain


pud


HTN


kidney stones








plan colonoscopy





Subjective


ROS Limited/Unobtainable:  Yes


Allergies:  


Coded Allergies:  


     DIAZEPAM (Verified  Allergy, Mild, Itching, 12/13/15)


     DOBUTAMINE (Verified  Allergy, Unknown, 12/13/15)


     MORPHINE (Verified  Allergy, Unknown, 6/4/17)


     SOLIFENACIN (Verified  Allergy, Unknown, 6/4/17)





Objective


General Appearance:  alert


EENT:  normal ENT inspection


Neck:  supple


Cardiovascular:  normal rate


Respiratory/Chest:  decreased breath sounds


Abdomen:  normal bowel sounds, non tender, soft


Extremities:  non-tender











Nicholas Crowley MD Sep 5, 2019 13:50

## 2019-09-09 NOTE — SHORT STAY SURGERY H&P
History of Present Illness


History of Present Illness


Chief Complaint


screening colon


HPI


Alex Soliman is a 68 year old female who was admitted on  for Screening





Patient History


Allergies:  


Coded Allergies:  


     DIAZEPAM (Verified  Allergy, Mild, Itching, 12/13/15)


     DOBUTAMINE (Verified  Allergy, Unknown, 12/13/15)


     MORPHINE (Verified  Allergy, Unknown, 6/4/17)


     SOLIFENACIN (Verified  Allergy, Unknown, 6/4/17)


PAST MEDICAL HISTORY:  


(1) Abdominal pain


(2) UTI (urinary tract infection)


(3) Diarrhea


(4) Abnormal laboratory test result


(5) Hypoglycemia





Medication History


Scheduled


Apremilast (Otezla), 30 MG PO BID, (Reported)


Atorvastatin Calcium* (Lipitor*), 40 MG ORAL QHS, (Reported)


Calcitriol (Calcitriol), 0.25 MCG PO DAILY, (Reported)


Diphenhydramine Hcl* (Diphenhydramine Hcl*), 25 MG ORAL HS, (Reported)


Duloxetine Hcl* (Cymbalta*), 30 MG ORAL DAILY, (Reported)


Gabapentin* (Gabapentin*), 300 MG ORAL BID, (Reported)


Indapamide* (Indapamide*), 2.5 MG ORAL DAILY, (Reported)


Insulin Glargine (Lantus), Unknown Dose SUBQ BEDTIME, (Reported)


Irbesartan* (Avapro*), 300 MG ORAL DAILY, (Reported)


Magnesium Oxide (Mag-Oxide), 800 MG PO DAILY, (Reported)


Metoprolol Succinate* (Metoprolol Succinate*), 25 MG ORAL DAILY, (Reported)


Mirabegron (Myrbetriq), 25 MG PO DAILY, (Reported)


Omeprazole (Omeprazole), 20 MG ORAL DAILY, (Reported)





Miscellaneous Medications


[Novolog], Unknown Dose, (Reported)





Discontinued Medications


Acetaminophen With Codeine (T#3) (Tylenol #3 Tab*), 1 TAB ORAL Q8H PRN for For 

Pain


   Discontinued Reason: Pt stopped taking med


Acetaminophen* (Tylenol Extra Strength*), 500 MG ORAL Q8H PRN for Prn Headache/

Temp > 101


   Discontinued Reason: Pt stopped taking med


Alprazolam* (Xanax*), 1 MG ORAL BEDTIME, (Reported)


   Discontinued Reason: MD discontinued med


Apremilast (Otezla), 30 MG PO BID, (Reported)


   Discontinued Reason: MD discontinued med


Aspirin* (Aspir 81*), 325 MG ORAL DAILY


   Discontinued Reason: MD discontinued med


Baclofen* (Baclofen*), 10 MG ORAL for Muscle Spasm, (Reported)


   Discontinued Reason: MD discontinued med


Budesonide (Budesonide), 0.5 MG IH BID, (Reported)


   Discontinued Reason: MD discontinued med


Clotrimazole/Betamethasone Dip (Clotrimazole-Betamethasone Crm), 15 GM TP DAILY 

PRN for PRN, (Reported)


   Discontinued Reason: Therapy completed


Cyclobenzaprine Hcl* (Flexeril*), 10 MG ORAL TID PRN for Muscle Spasm


   Discontinued Reason: MD discontinued med


Formoterol Fumarate (Perforomist), 20 MCG IH BID, (Reported)


   Discontinued Reason: MD discontinued med


Hydrochlorothiazide* (Hydrochlorothiazide*), 25 MG ORAL DAILY, (Reported)


   Discontinued Reason: MD discontinued med


Hydrocodone/Acetaminophen (Hydrocodon-Acetaminophn ), 1 TAB ORAL Q4H PRN 

for For Pain, (Reported)


   Discontinued Reason: MD discontinued med


Hydrocortisone (Hydrocortisone Cream 2.5%), 1 APPLIC TP for prn, (Reported)


   Discontinued Reason: MD discontinued med


Lidocaine (Lidocaine), Unknown Dose TDERMAL BID, (Reported)


   Discontinued Reason: MD discontinued med


Lisinopril (Lisinopril*), 5 MG ORAL DAILY, (Reported)


   Discontinued Reason: MD discontinued med


Metformin Hcl* (Metformin Hcl*), 1,000 MG ORAL BID, (Reported)


   Discontinued Reason: MD discontinued med


Naproxen* (Naproxen*), 500 MG ORAL TWICE A DAY PRN for For Pain, (Reported)


   Discontinued Reason: MD discontinued med


Primidone* (Mysoline*), 50 MG PO QHS, (Reported)


   Discontinued Reason: MD discontinued med


Solifenacin Succinate* (Vesicare*), 5 MG ORAL DAILY, (Reported)


   Discontinued Reason: MD discontinued med


Warfarin Sod* (Coumadin*), 5 MG ORAL DAILY, (Reported)


   Discontinued Reason: MD discontinued med





Review of Systems


Cardiovascular:  Reports: no symptoms


Respiratory:  Reports: no symptoms


Skeletal:  Reports: no symptoms


Gastrointestinal:  Reports: no symptoms


Genitourinary:  Reports: no symptoms


Neurologic:  Reports: no symptoms





Physical Exam


Vital Signs





Last Vital Signs








  Date Time  Temp Pulse Resp B/P (MAP) Pulse Ox O2 Delivery O2 Flow Rate FiO2


 


9/9/19 09:57      Room Air  


 


9/9/19 09:21 98.0 89 18 132/73 98   








Skin:  normal


HENT:  normal


Heart:  normal


Lungs:  normal


Abdomen:  normal


Extremities:  normal





Plan


Plan of Care


colonoscopy


Attestation


Are the patient's medical conditions optimized for surgery?


Attestation Response:  yes











Nicholas Crowley MD Sep 9, 2019 10:03

## 2019-09-09 NOTE — ANETHESIA PREOPERATIVE EVAL
Anesthesia Pre-op PMH/ROS


General


Date of Evaluation:  Sep 9, 2019


Time of Evaluation:  09:56


Anesthesiologist:  Mello


ASA Score:  ASA 3


Mallampati Score


Class I : Soft palate, uvula, fauces, pillars visible


Class II: Soft palate, uvula, fauces visible


Class III: Soft palate, base of uvula visible


Class IV: Only hard plate visible


Mallampati Classification:  Class III


Surgeon:  Carline


Diagnosis:  Abd Pain


Surgical Procedure:  Colonopscopy


Anesthesia History:  none


Social History:  current smoker


Family History:  no anesthesia problems


Allergies:  


Coded Allergies:  


     DIAZEPAM (Verified  Allergy, Mild, Itching, 12/13/15)


     DOBUTAMINE (Verified  Allergy, Unknown, 12/13/15)


     MORPHINE (Verified  Allergy, Unknown, 6/4/17)


     SOLIFENACIN (Verified  Allergy, Unknown, 6/4/17)


Medications:  see eMAR


Patient NPO?:  Yes





Past Medical History


Cardiovascular:  Reports: HTN, CAD - Angina, Angioplasty X3


Pulmonary:  Reports: asthma, COPD


Gastrointestinal/Genitourinary:  Reports: GERD, ESRD, other - GI Ulcer


Endocrine:  Reports: DM, hypothyroidism


Hematology/Immune:  Reports: anemia, DVT


Other:  obesity - BMI 39


PSxH Narrative:


Appendectomy, Ventral Hernia, Cholecystectomy





Anesthesia Pre-op Phys. Exam


Physician Exam





Last Vital Signs








  Date Time  Temp Pulse Resp B/P (MAP) Pulse Ox O2 Delivery O2 Flow Rate FiO2


 


9/9/19 09:21 98.0 89 18 132/73 98 Room Air  








Constitutional:  NAD


Neurologic:  CN 2-12 intact


Cardiovascular:  RRR


Respiratory:  CTA


Gastrointestinal:  S/NT/ND





Airway Exam


Mallampati Score:  Class III


MO:  limited


ROM:  limited


Teeth:  missing





Anesthesia Pre-op A/P


Studies


Pre-op Studies:  EKG - LAD, Infarct





Risk Assessment & Plan


Assessment:


ASA 3


Plan:


TIVA


Status Change Before Surgery:  No











Miguel A Sarkar MD Sep 9, 2019 10:00

## 2019-09-09 NOTE — ENDOSCOPY PROCEDURE NOTE
Endoscopy Procedure Note


General


Indication for Procedure:  screening, anemia


Procedures Performed:  colonoscopy


Operative Findings/Diagnosis:  4 polyps


Specimen:  yes


Pt Tolerated Procedure Well:  Yes


Estimated Blood Loss:  none





Anesthesia


Anesthesiologist:  david


Anesthesia:  MAC





Inserted Devices


Implant(s) used?:  No





Quality


Quality of Bowel Preparation:  Fair


Did scope reach the cecum?:  Yes


Was there any complications?:  No





GI Core Measures


50 yrs or older w/o bx or poly:  No


10yrs. F/U recommended:  Yes


If not recommended, why?:  Above average risk


18 years or older w/prev. colo:  No











Nicholas Crowley MD Sep 9, 2019 10:39

## 2019-09-09 NOTE — 48 HOUR POST ANESTHESIA EVAL
Post Anesthesia Evaluation


Procedure:  Colonoscopy


Date of Evaluation:  Sep 9, 2019


Time of Evaluation:  13:12


Blood Pressure Systolic:  154


0:  78


Pulse Rate:  73


Respiratory Rate:  18


Temperature (Fahrenheit):  97.6


O2 Sat by Pulse Oximetry:  97


Airway:  patent


Nausea:  No


Vomiting:  No


Pain Intensity:  2


Hydration Status:  adequate


Cardiopulmonary Status:


Stable


Mental Status/LOC:  patient returned to baseline


Follow-up Care/Observations:


0


Post-Anesthesia Complications:


0


Follow-up care needed:  ready to discharge











Miguel A Sarkar MD Sep 9, 2019 10:02

## 2019-09-09 NOTE — IMMEDIATE POST-OP EVALUATION
Immediate Post-Op Evalulation


Immediate Post-Op Evalulation


Procedure:  Colonoscopy


Date of Evaluation:  Sep 9, 2019


Time of Evaluation:  10:57


IV Fluids:  300 LR


Blood Products:  0


Estimated Blood Loss:  3


Urinary Output:  0


Blood Pressure Systolic:  95


Blood Pressure Diastolic:  62


Pulse Rate:  84


Respiratory Rate:  16


O2 Sat by Pulse Oximetry:  97


Temperature (Fahrenheit):  97.3


Pain Score (1-10):  2


Nausea:  No


Vomiting:  No


Complications


0


Patient Status:  awake, reacts, patent, none


Hydration Status:  adequate











Miguel A Sarkar MD Sep 9, 2019 10:00

## 2019-09-09 NOTE — SHORT STAY SURGERY H&P
History of Present Illness


History of Present Illness


Chief Complaint


see office note


HPI


Alex Soliman is a 68 year old female who was admitted on  for Screening





Patient History


Allergies:  


Coded Allergies:  


     DIAZEPAM (Verified  Allergy, Mild, Itching, 12/13/15)


     DOBUTAMINE (Verified  Allergy, Unknown, 12/13/15)


     MORPHINE (Verified  Allergy, Unknown, 6/4/17)


     SOLIFENACIN (Verified  Allergy, Unknown, 6/4/17)





Medication History


Scheduled


Apremilast (Otezla), 30 MG PO BID, (Reported)


Atorvastatin Calcium* (Lipitor*), 40 MG ORAL QHS, (Reported)


Calcitriol (Calcitriol), 0.25 MCG PO DAILY, (Reported)


Diphenhydramine Hcl* (Diphenhydramine Hcl*), 25 MG ORAL HS, (Reported)


Duloxetine Hcl* (Cymbalta*), 30 MG ORAL DAILY, (Reported)


Gabapentin* (Gabapentin*), 300 MG ORAL BID, (Reported)


Indapamide* (Indapamide*), 2.5 MG ORAL DAILY, (Reported)


Insulin Glargine (Lantus), Unknown Dose SUBQ BEDTIME, (Reported)


Irbesartan* (Avapro*), 300 MG ORAL DAILY, (Reported)


Magnesium Oxide (Mag-Oxide), 800 MG PO DAILY, (Reported)


Metoprolol Succinate* (Metoprolol Succinate*), 25 MG ORAL DAILY, (Reported)


Mirabegron (Myrbetriq), 25 MG PO DAILY, (Reported)


Omeprazole (Omeprazole), 20 MG ORAL DAILY, (Reported)





Miscellaneous Medications


[Novolog], Unknown Dose, (Reported)





Discontinued Medications


Acetaminophen With Codeine (T#3) (Tylenol #3 Tab*), 1 TAB ORAL Q8H PRN for For 

Pain


   Discontinued Reason: Pt stopped taking med


Acetaminophen* (Tylenol Extra Strength*), 500 MG ORAL Q8H PRN for Prn Headache/

Temp > 101


   Discontinued Reason: Pt stopped taking med


Alprazolam* (Xanax*), 1 MG ORAL BEDTIME, (Reported)


   Discontinued Reason: MD discontinued med


Apremilast (Otezla), 30 MG PO BID, (Reported)


   Discontinued Reason: MD discontinued med


Aspirin* (Aspir 81*), 325 MG ORAL DAILY


   Discontinued Reason: MD discontinued med


Baclofen* (Baclofen*), 10 MG ORAL for Muscle Spasm, (Reported)


   Discontinued Reason: MD discontinued med


Budesonide (Budesonide), 0.5 MG IH BID, (Reported)


   Discontinued Reason: MD discontinued med


Clotrimazole/Betamethasone Dip (Clotrimazole-Betamethasone Crm), 15 GM TP DAILY 

PRN for PRN, (Reported)


   Discontinued Reason: Therapy completed


Cyclobenzaprine Hcl* (Flexeril*), 10 MG ORAL TID PRN for Muscle Spasm


   Discontinued Reason: MD discontinued med


Formoterol Fumarate (Perforomist), 20 MCG IH BID, (Reported)


   Discontinued Reason: MD discontinued med


Hydrochlorothiazide* (Hydrochlorothiazide*), 25 MG ORAL DAILY, (Reported)


   Discontinued Reason: MD discontinued med


Hydrocodone/Acetaminophen (Hydrocodon-Acetaminophn ), 1 TAB ORAL Q4H PRN 

for For Pain, (Reported)


   Discontinued Reason: MD discontinued med


Hydrocortisone (Hydrocortisone Cream 2.5%), 1 APPLIC TP for prn, (Reported)


   Discontinued Reason: MD discontinued med


Lidocaine (Lidocaine), Unknown Dose TDERMAL BID, (Reported)


   Discontinued Reason: MD discontinued med


Lisinopril (Lisinopril*), 5 MG ORAL DAILY, (Reported)


   Discontinued Reason: MD discontinued med


Metformin Hcl* (Metformin Hcl*), 1,000 MG ORAL BID, (Reported)


   Discontinued Reason: MD discontinued med


Naproxen* (Naproxen*), 500 MG ORAL TWICE A DAY PRN for For Pain, (Reported)


   Discontinued Reason: MD discontinued med


Primidone* (Mysoline*), 50 MG PO QHS, (Reported)


   Discontinued Reason: MD discontinued med


Solifenacin Succinate* (Vesicare*), 5 MG ORAL DAILY, (Reported)


   Discontinued Reason: MD discontinued med


Warfarin Sod* (Coumadin*), 5 MG ORAL DAILY, (Reported)


   Discontinued Reason: MD discontinued med





Physical Exam


Vital Signs





Last Vital Signs








  Date Time  Temp Pulse Resp B/P (MAP) Pulse Ox O2 Delivery O2 Flow Rate FiO2


 


9/9/19 09:21 98.0 89 18 132/73 98 Room Air  











Plan


Attestation


Are the patient's medical conditions optimized for surgery?











Nicholas Crowley MD Sep 9, 2019 09:58

## 2019-09-09 NOTE — PROCEDURE NOTE
DATE OF PROCEDURE:  09/09/2019

SURGEON:  Nicholas Crowley M.D.



PROCEDURE:  Colonoscopy with snare polypectomy and biopsy.



ANESTHESIA:  Per Dr. Sarkar.



INSTRUMENT:  Olympus flexible adult colonoscope.



INDICATION:  Screening colonoscopy and anemia.



REASON FOR PROCEDURE:  The procedure, risks, benefits, and possible

consequences, including hemorrhage, aspiration, perforation and infection,

and alternative treatments, were explained to the patient/legal guardian

by Dr. Nicholas Crowley and the patient/legal guardian understood and

accepted these risks.



PROCEDURE IN DETAIL:  After informed consent was obtained, the patient was

adequately sedated, first rectal exam was performed, which was normal.

Then, the scope was advanced from the rectum into the cecum documented by

appendix orifice, ileocecal valve, and right upper quadrant palpation.

Quality of prep was fair.



The patient had a large polyp in the proximal ascending colon.  This

polyp measured roughly about 2 cm, was removed with a piecemeal fashion.

There were another two polyps, they were smaller in the transverse colon,

removed with the cold biopsy forceps technique.  There was another polyp

in the sigmoid, measured roughly about 7 to 8 mm, removed with the snare

polypectomy technique.  So, total of four polyps removed.  The patient had

evidence of diverticulosis throughout the colon.  Retroflexion of rectum

showed evidence of internal hemorrhoids.  The patient tolerated the

procedure very well without any complication.



SUMMARY OF FINDINGS:

1. Fair colonic prep.

2. Four colonic polyps removed, one of them over 2 cm.

3. Internal hemorrhoids.

4. Diverticulosis.



RECOMMENDATIONS:

1. Follow up pathology.

2. If no obvious malignancy, we recommend repeat colonoscopy in one

year, given this prep and multiple polyps.









  ______________________________________________

  Nicholas Crowley M.D. DR:  JASBIR

D:  09/09/2019 10:38

T:  09/10/2019 03:53

JOB#:  [id number]

CC:

## 2019-09-09 NOTE — PRE-PROCEDURE NOTE/ATTESTATION
Pre-Procedure Note/Attestation


Complete Prior to Procedure


Planned Procedure:  not applicable


Procedure Narrative:


colonoscopy





Indications for Procedure


Pre-Operative Diagnosis:


screening





Attestation


I attest that I discussed the nature of the procedure; its benefits; risks and 

complications; and alternatives (and the risks and benefits of such alternatives

), prior to the procedure, with the patient (or the patient's legal 

representative).





I attest that, if there was a reasonable possibility of needing a blood 

transfusion, the patient (or the patient's legal representative) was given the 

[facility] standardized written summary, pursuant 

to the Caleb [employer] Blood Safety Act ([place] Health and Safety Code # 1645, as 

amended).





I attest that I re-evaluated the patient just prior to the surgery and that 

there has been no change in the patient's H&P, except as documented below:











Nicholas Crowley MD Sep 9, 2019 09:57

## 2019-09-10 NOTE — CARDIOLOGY REPORT
--------------- APPROVED REPORT --------------





EKG Measurement

Heart [id number]

TX [id number]

[id number]

[id number]

[id number]





Normal sinus rhythm

Left axis deviation

Possible Anterior infarct, age undetermined

Abnormal ECG

## 2024-09-25 NOTE — PRE-PROCEDURE NOTE/ATTESTATION
LVM asking ot to call back [facility]- E[facility]. Please secure chat or Teams Dina Fuentes RN or Lianne PATINOR to try and take the call.     Dr Ricks had wanted pt to have audiogram completed prior to todays ENT appt if possible. Writer called to offer pt audiology appt today for 1:30 PM but no answer.     Even if pt cannot do audiogram today, we can still have ENT appt.   We will schedule audiogram when pt comes in for today's ENT appt.    Pre-Procedure Note/Attestation


Complete Prior to Procedure


Planned Procedure:  left


Procedure Narrative:


cystoscopy, laser cystolitholapaxy, removal of left ureteral stent, possible 

ureteroscopy





Indications for Procedure


Pre-Operative Diagnosis:


hx of left nephrolithiasis, s/p lithotripsy, indwelling left ureteral stent 

with encrustations





Attestation


I attest that I discussed the nature of the procedure; its benefits; risks and 

complications; and alternatives (and the risks and benefits of such alternatives

), prior to the procedure, with the patient (or the patient's legal 

representative).





I attest that, if there was a reasonable possibility of needing a blood 

transfusion, the patient (or the patient's legal representative) was given the 

[place] [facility] standardized written summary, pursuant 

to the Caleb Pol[employer] Blood Safety Act ([place] Health and Safety Code # 1645, as 

amended).





I attest that I re-evaluated the patient just prior to the surgery and that 

there has been no change in the patient's H&P, except as documented below:


n/a











FAN RICHMOND May 17, 2017 07:13